# Patient Record
Sex: MALE | Race: WHITE | Employment: FULL TIME | URBAN - NONMETROPOLITAN AREA
[De-identification: names, ages, dates, MRNs, and addresses within clinical notes are randomized per-mention and may not be internally consistent; named-entity substitution may affect disease eponyms.]

---

## 2020-09-25 ENCOUNTER — TELEPHONE (OUTPATIENT)
Dept: FAMILY MEDICINE CLINIC | Age: 50
End: 2020-09-25

## 2020-09-25 DIAGNOSIS — R06.2 WHEEZING: Primary | ICD-10-CM

## 2020-09-25 RX ORDER — ALBUTEROL SULFATE 90 UG/1
AEROSOL, METERED RESPIRATORY (INHALATION)
COMMUNITY
End: 2020-09-25 | Stop reason: SDUPTHER

## 2020-09-25 RX ORDER — ALBUTEROL SULFATE 90 UG/1
2 AEROSOL, METERED RESPIRATORY (INHALATION)
Qty: 2 INHALER | Refills: 3 | Status: SHIPPED | OUTPATIENT
Start: 2020-09-25

## 2022-02-24 ENCOUNTER — OFFICE VISIT (OUTPATIENT)
Dept: FAMILY MEDICINE CLINIC | Age: 52
End: 2022-02-24
Payer: COMMERCIAL

## 2022-02-24 VITALS
WEIGHT: 208.3 LBS | BODY MASS INDEX: 30.85 KG/M2 | HEIGHT: 69 IN | HEART RATE: 83 BPM | DIASTOLIC BLOOD PRESSURE: 84 MMHG | SYSTOLIC BLOOD PRESSURE: 176 MMHG | OXYGEN SATURATION: 97 %

## 2022-02-24 DIAGNOSIS — E11.9 TYPE 2 DIABETES MELLITUS WITHOUT COMPLICATION, WITHOUT LONG-TERM CURRENT USE OF INSULIN (HCC): ICD-10-CM

## 2022-02-24 DIAGNOSIS — I10 PRIMARY HYPERTENSION: ICD-10-CM

## 2022-02-24 DIAGNOSIS — E11.9 TYPE 2 DIABETES MELLITUS WITHOUT COMPLICATION, WITHOUT LONG-TERM CURRENT USE OF INSULIN (HCC): Primary | ICD-10-CM

## 2022-02-24 DIAGNOSIS — Z91.199 NON-COMPLIANCE: ICD-10-CM

## 2022-02-24 PROCEDURE — 99214 OFFICE O/P EST MOD 30 MIN: CPT | Performed by: NURSE PRACTITIONER

## 2022-02-24 RX ORDER — METFORMIN HYDROCHLORIDE 500 MG/1
500 TABLET, EXTENDED RELEASE ORAL
Qty: 21 TABLET | Refills: 0 | Status: SHIPPED | OUTPATIENT
Start: 2022-02-24 | End: 2022-03-16 | Stop reason: ALTCHOICE

## 2022-02-24 NOTE — PROGRESS NOTES
History of Present Illness  Lorraine Hinojosa is a 45 y.o. male who presents today to get established with new provider. Patient is aware he is a type II diabetic however he does not take medications or check his blood sugars. He now states that he wants to live and wants to get his health under control. He reports his diet does consist of some fast food pizza, and drinks six or more Coke Zero's daily. He stated he has tried Metformin, Jardiance, and Ozempic. He says his life is very busy and he forgets to take his medicines if they are ordered more than once a day. During visit patient kept looking in his watch stating that he needs to leave soon to go  his son, also kept repeating that his wife is a nurse. Chief Complaint   Patient presents with   [address] [place] [address] Establish Care     establish care with provider     Complete Physical     needs a physical to get insurance          Past Medical History  History reviewed. No pertinent past medical history. Surgical History  History reviewed. No pertinent surgical history. Current Medications  Current Outpatient Medications   Medication Sig    albuterol (ProAir HFA) 90 mcg/actuation inhaler Take 2 Puffs by inhalation every four (4) hours as needed for Wheezing. No current facility-administered medications for this visit. Allergies/Drug Reactions  No Known Allergies     Family History  History reviewed. No pertinent family history.      Social History  Social History     Tobacco Use    Smoking status: Never Smoker    Smokeless tobacco: Never Used   Vaping Use    Vaping Use: Never used   Substance Use Topics    Alcohol use: Never    Drug use: Never        Health Maintenance   Topic Date Due    Hepatitis C Screening  Never done    Depression Screen  Never done    Lipid Screen  Never done    COVID-19 Vaccine (1) 02/12/2030 (Originally 4/5/1975)    Shingrix Vaccine Age 50> (1 of 2) 06/19/2030 (Originally 4/5/2019)    Flu Vaccine (1) 06/27/2030 (Originally 9/1/2021)    DTaP/Tdap/Td series (1 - Tdap) 02/04/2031 (Originally 4/5/1990)    Colorectal Cancer Screening Combo  04/17/2030    Pneumococcal 0-64 years  Aged Out       There is no immunization history on file for this patient. Review of Systems  Review of Systems   All other systems reviewed and are negative. Physical Exam  Constitutional:       Appearance: Normal appearance. HENT:      Head: Normocephalic. Right Ear: Tympanic membrane normal.      Left Ear: Tympanic membrane normal.      Nose: Nose normal.      Mouth/Throat:      Mouth: Mucous membranes are moist.   Eyes:      Pupils: Pupils are equal, round, and reactive to light. Cardiovascular:      Rate and Rhythm: Normal rate and regular rhythm. Pulses: Normal pulses. Heart sounds: Normal heart sounds. Pulmonary:      Effort: Pulmonary effort is normal.      Breath sounds: Normal breath sounds. Abdominal:      General: Bowel sounds are normal.   Musculoskeletal:         General: Normal range of motion. Cervical back: Normal range of motion. Skin:     General: Skin is warm. Capillary Refill: Capillary refill takes 2 to 3 seconds. Neurological:      Mental Status: He is alert and oriented to person, place, and time. Vital signs:   Visit Vitals  BP (!) 176/84   Pulse 83   Ht 5' 9\" (1.753 m)   Wt 208 lb 4.8 oz (94.5 kg)   SpO2 97%   BMI 30.76 kg/m²          Laboratory/Tests:  No visits with results within 3 Month(s) from this visit.    Latest known visit with results is:   Results on 10/18/2010   Component Date Value Ref Range Status    Vitamin B12 10/18/2010 511  211 - 911 pg/mL Final    Folate 10/18/2010 19.1  5.38 - 24.0 ng/mL Final    WBC 10/18/2010 7.1  4.5 - 13.0 K/uL Final    RBC 10/18/2010 5.01  4.50 - 5.30 M/uL Final    HGB 10/18/2010 15.4  13.0 - 16.0 g/dL Final    HCT 10/18/2010 45.6  37.0 - 49.0 % Final    MCV 10/18/2010 91.0  78.0 - 98.0 FL Final    MCH 10/18/2010 30.7 25.0 - 35.0 PG Final    MCHC 10/18/2010 33.8  31.0 - 37.0 g/dL Final    RDW 10/18/2010 13.0  11.5 - 14.5 % Final    PLATELET 61/74/5811 658  130 - 400 K/uL Final    MPV 10/18/2010 8.5  7.4 - 10.4 FL Final    NEUTROPHILS 10/18/2010 59  42 - 75 % Final    LYMPHOCYTES 10/18/2010 31  20 - 51 % Final    MONOCYTES 10/18/2010 7  2 - 9 % Final    EOSINOPHILS 10/18/2010 3  0 - 5 % Final    BASOPHILS 10/18/2010 0  0 - 3 % Final    ABS. NEUTROPHILS 10/18/2010 4.1  1.8 - 8.0 K/UL Final    ABS. LYMPHOCYTES 10/18/2010 2.2  0.8 - 3.5 K/UL Final    ABS. MONOCYTES 10/18/2010 0.5  0 - 1.0 K/UL Final    ABS. EOSINOPHILS 10/18/2010 0.2  0.0 - 0.4 K/UL Final    ABS. BASOPHILS 10/18/2010 0.0  0.0 - 0.1 K/UL Final    DF 10/18/2010 AUTOMATED   Final    Sodium 10/18/2010 139  136 - 145 MMOL/L Final    Potassium 10/18/2010 4.3  3.5 - 5.5 MMOL/L Final    Chloride 10/18/2010 100  100 - 108 MMOL/L Final    CO2 10/18/2010 22  21 - 32 MMOL/L Final    Anion gap 10/18/2010 17* 5 - 15 mmol/L Final    Glucose 10/18/2010 93  74 - 99 MG/DL Final    BUN 10/18/2010 14  7 - 18 MG/DL Final    Creatinine 10/18/2010 0.8  0.6 - 1.3 MG/DL Final    BUN/Creatinine ratio 10/18/2010 18  12 - 20   Final    GFR est AA 10/18/2010 >60  >60 ml/min/1.73m2 Final    GFR est non-AA 10/18/2010 >60  >60 ml/min/1.73m2 Final    Comment: (NOTE)                           Estimated GFR is calculated using the Modification of Diet in Renal                            Disease (MDRD) Study equation, reported for both  Americans                            (GFRAA) and non- Americans (GFRNA), and normalized to 1.73m2                            body surface area. The physician must decide which value applies to                            the patient. The MDRD study equation should only be used in                            individuals age 25 or older.  It has not been validated for the                            following: pregnant women, patients with serious comorbid conditions,                            or on certain medications, or persons with extremes of body size,                            muscle mass, or nutritional status.  Calcium 10/18/2010 9.1  8.4 - 10.4 MG/DL Final       1. Type 2 diabetes mellitus without complication, without long-term current use of insulin (HCC)   discussion was had with patient concerning proper treatment and management of his diabetes along with long-term side effects of not managing his diabetic disease. Including but not limited to kidney disease, coronary artery disease complications that occur can occur from vascular disorders including but not limited to amputations. Instructed patient labs will be place would like for him to get them within the next week if possible. He is willing to get blood glucose testing kit and start Metformin which he only wants to take one time a day stating I'lll forget the second dose. Instructed patient to check blood glucose and log bring log to next visit. Discussed the possibility of needing insulin to bring his blood sugars and A1c down. He does perform feet inspections daily follows up with the eye doctor yearly and sees his dentist every 6 months.      - CBC W/O DIFF; Future  - HEPATITIS C AB; Future  - METABOLIC PANEL, COMPREHENSIVE; Future  - LIPID PANEL; Future  - AMB POC HEMOGLOBIN A1C; Future  - MICROALBUMIN, UR, RAND W/ MICROALB/CREAT RATIO; Future  - metFORMIN ER (GLUCOPHAGE XR) 500 mg tablet; Take 1 Tablet by mouth daily (with dinner) for 21 days. Dispense: 21 Tablet; Refill: 0  - VITAMIN B12; Future  - FOLATE; Future  - TSH 3RD GENERATION; Future    2. Primary hypertension  Has not been on any medication in years, does not want to go on any type of blood pressure medication at this time, discussed dietary measures such as the DASH diet.  Instructed patient to get a home monitoring blood pressure kit to monitor his blood pressure and log of them and bring to next visit  3. Non-compliance  Previous medical problems from prior providers show hyperlipidemia with an onset Padmini 15, 2017, anxiety disorder, depressive disorder, type 2 diabetes essential hypertension, neck pain, fatigue, heart murmur, along with patient's noncompliance. I would like to see him in a couple of weeks to review his labs and to continue to discuss his medical treatment plan. I have discussed the diagnosis with the patient and the intended plan as seen in the above orders. The patient has received an after-visit summary and questions were answered concerning future plans. I have discussed medication side effects and warnings with the patient as well. I have reviewed the plan of care with the patient, accepted their input and they are in agreement with the treatment goals. Previous lab and imaging results were reviewed by me.        [facility], [place]  February 24, 2022

## 2022-02-24 NOTE — PROGRESS NOTES
Jeffory Holstein presents today for   Chief Complaint   Patient presents with   [facility] Establish Care     establish care with provider     Complete Physical     needs a physical to get insurance        Is someone accompanying this pt? No    Is the patient using any DME equipment during OV? No    Depression Screening:  3 most recent PHQ Screens 2/24/2022   Little interest or pleasure in doing things Not at all   Feeling down, depressed, irritable, or hopeless Not at all   Total Score PHQ 2 0       Learning Assessment:  No flowsheet data found. Fall Risk  No flowsheet data found. ADL  No flowsheet data found. Travel Screening:    Travel Screening     Question   Response    In the last month, have you been in contact with someone who was confirmed or suspected to have Coronavirus / COVID-19? No / Unsure    Have you had a COVID-19 viral test in the last 14 days? No    Do you have any of the following new or worsening symptoms? None of these    Have you traveled internationally or domestically in the last month? No      Travel History   Travel since 01/24/22    No documented travel since 01/24/22         Health Maintenance reviewed and discussed and ordered per Provider. Health Maintenance Due   Topic Date Due    Hepatitis C Screening  Never done    Depression Screen  Never done    COVID-19 Vaccine (1) Never done    DTaP/Tdap/Td series (1 - Tdap) Never done    Lipid Screen  Never done    Colorectal Cancer Screening Combo  Never done    Shingrix Vaccine Age 50> (1 of 2) Never done    Flu Vaccine (1) Never done   . Coordination of Care:  1. \"Have you been to the ER, urgent care clinic since your last visit? Hospitalized since your last visit? \" No    2. \"Have you seen or consulted any other health care providers outside of the [address] [place] [address] since your last visit? \" No     3. For patients aged 39-70: Has the patient had a colonoscopy? No     If the patient is female:    4.  For patients aged 41-77: Has the patient had a mammogram within the past 2 years? NA - based on age    11. For patients aged 21-65: Has the patient had a pap smear?  NA - based on age

## 2022-02-25 PROBLEM — Z91.199 NON-COMPLIANCE: Status: ACTIVE | Noted: 2022-02-25

## 2022-02-25 PROBLEM — I10 PRIMARY HYPERTENSION: Status: ACTIVE | Noted: 2022-02-25

## 2022-03-03 ENCOUNTER — HOSPITAL ENCOUNTER (OUTPATIENT)
Dept: LAB | Age: 52
Discharge: HOME | End: 2022-03-03
Payer: COMMERCIAL

## 2022-03-03 LAB
ALBUMIN SERPL-MCNC: 4.3 G/DL (ref 3.5–4.7)
ALBUMIN/GLOB SERPL: 1.3 {RATIO}
ALP SERPL-CCNC: 82 U/L (ref 38–126)
ALT SERPL-CCNC: 20 U/L (ref 3–72)
ANION GAP SERPL CALC-SCNC: 12 MMOL/L
AST SERPL W P-5'-P-CCNC: 17 U/L (ref 17–74)
BILIRUB SERPL-MCNC: 0.7 MG/DL (ref 0.2–1)
BUN SERPL-MCNC: 15 MG/DL (ref 9–21)
BUN/CREAT SERPL: 30
CA-I BLD-MCNC: 9.7 MG/DL (ref 8.5–10.5)
CHLORIDE SERPL-SCNC: 98 MMOL/L (ref 94–111)
CHOLEST SERPL-MCNC: 310 MG/DL
CO2 SERPL-SCNC: 26 MMOL/L (ref 21–33)
CREAT SERPL-MCNC: 0.5 MG/DL (ref 0.8–1.5)
CREAT UR-MCNC: 85 MG/DL (ref 30–125)
ERYTHROCYTE [DISTWIDTH] IN BLOOD BY AUTOMATED COUNT: 12 % (ref 11.6–14.5)
FOLATE SERPL-MCNC: 18.9 NG/ML (ref 3.1–17.5)
GLOBULIN SER CALC-MCNC: 3.2 G/DL
GLUCOSE SERPL-MCNC: 289 MG/DL (ref 70–110)
HCT VFR BLD AUTO: 47.2 % (ref 36–48)
HDLC SERPL-MCNC: 39 MG/DL (ref 40–60)
HDLC SERPL: 7.9 {RATIO} (ref 0–5)
HGB BLD-MCNC: 15.9 G/DL (ref 13–16)
LDLC SERPL CALC-MCNC: 198.4 MG/DL (ref 0–100)
LIPID PROFILE,FLP: ABNORMAL
MCH RBC QN AUTO: 29 PG (ref 24–34)
MCHC RBC AUTO-ENTMCNC: 33.7 G/DL (ref 31–37)
MCV RBC AUTO: 86 FL (ref 78–100)
MICROALBUMIN UR-MCNC: 47.4 MG/DL (ref 0–3)
MICROALBUMIN/CREAT UR-RTO: 558 MGMALB/GCRE (ref 0–30)
NRBC # BLD: 0 K/UL (ref 0–0.01)
NRBC BLD-RTO: 0 PER 100 WBC
PLATELET # BLD AUTO: 298 K/UL (ref 135–420)
PMV BLD AUTO: 10.1 FL (ref 9.2–11.8)
POTASSIUM SERPL-SCNC: 4.4 MMOL/L (ref 3.2–5.1)
PROT SERPL-MCNC: 7.5 G/DL (ref 6.1–8.4)
RBC # BLD AUTO: 5.49 M/UL (ref 4.35–5.65)
SODIUM SERPL-SCNC: 136 MMOL/L (ref 135–145)
TRIGL SERPL-MCNC: 363 MG/DL (ref ?–150)
TSH SERPL DL<=0.05 MIU/L-ACNC: 6.71 UIU/ML (ref 0.35–6.2)
VIT B12 SERPL-MCNC: 508 PG/ML (ref 211–911)
VLDLC SERPL CALC-MCNC: 72.6 MG/DL
WBC # BLD AUTO: 5.7 K/UL (ref 4.6–13.2)

## 2022-03-03 PROCEDURE — 86803 HEPATITIS C AB TEST: CPT

## 2022-03-03 PROCEDURE — 80061 LIPID PANEL: CPT

## 2022-03-03 PROCEDURE — 80053 COMPREHEN METABOLIC PANEL: CPT

## 2022-03-03 PROCEDURE — 36415 COLL VENOUS BLD VENIPUNCTURE: CPT

## 2022-03-03 PROCEDURE — 82746 ASSAY OF FOLIC ACID SERUM: CPT

## 2022-03-03 PROCEDURE — 82043 UR ALBUMIN QUANTITATIVE: CPT

## 2022-03-03 PROCEDURE — 82607 VITAMIN B-12: CPT

## 2022-03-03 PROCEDURE — 85027 COMPLETE CBC AUTOMATED: CPT

## 2022-03-03 PROCEDURE — 84443 ASSAY THYROID STIM HORMONE: CPT

## 2022-03-04 LAB
HCV AB SER IA-ACNC: <0.02 INDEX
HCV AB SERPL QL IA: NEGATIVE
HCV COMMENT,HCGAC: NORMAL

## 2022-03-16 ENCOUNTER — OFFICE VISIT (OUTPATIENT)
Dept: FAMILY MEDICINE CLINIC | Age: 52
End: 2022-03-16
Payer: COMMERCIAL

## 2022-03-16 VITALS
DIASTOLIC BLOOD PRESSURE: 81 MMHG | OXYGEN SATURATION: 96 % | HEART RATE: 91 BPM | SYSTOLIC BLOOD PRESSURE: 167 MMHG | BODY MASS INDEX: 31.01 KG/M2 | WEIGHT: 210 LBS

## 2022-03-16 DIAGNOSIS — E78.2 MIXED HYPERLIPIDEMIA: ICD-10-CM

## 2022-03-16 DIAGNOSIS — E11.9 TYPE 2 DIABETES MELLITUS WITHOUT COMPLICATION, WITHOUT LONG-TERM CURRENT USE OF INSULIN (HCC): ICD-10-CM

## 2022-03-16 DIAGNOSIS — E03.9 ACQUIRED HYPOTHYROIDISM: ICD-10-CM

## 2022-03-16 DIAGNOSIS — I10 ESSENTIAL HYPERTENSION: Primary | ICD-10-CM

## 2022-03-16 PROCEDURE — 99214 OFFICE O/P EST MOD 30 MIN: CPT | Performed by: NURSE PRACTITIONER

## 2022-03-16 RX ORDER — IBUPROFEN 200 MG
CAPSULE ORAL
Qty: 100 STRIP | Refills: 4 | Status: SHIPPED | OUTPATIENT
Start: 2022-03-16

## 2022-03-16 RX ORDER — LOSARTAN POTASSIUM 25 MG/1
25 TABLET ORAL DAILY
Qty: 90 TABLET | Refills: 2 | Status: SHIPPED | OUTPATIENT
Start: 2022-03-16

## 2022-03-16 RX ORDER — METFORMIN HYDROCHLORIDE 500 MG/1
2 TABLET ORAL 2 TIMES DAILY WITH MEALS
COMMUNITY
End: 2022-03-16

## 2022-03-16 RX ORDER — METFORMIN HYDROCHLORIDE 500 MG/1
500 TABLET, EXTENDED RELEASE ORAL
Qty: 21 TABLET | Refills: 0 | Status: CANCELLED | OUTPATIENT
Start: 2022-03-16 | End: 2022-04-06

## 2022-03-16 RX ORDER — LEVOTHYROXINE SODIUM 25 UG/1
25 TABLET ORAL
Qty: 90 TABLET | Refills: 1 | Status: SHIPPED | OUTPATIENT
Start: 2022-03-16

## 2022-03-16 RX ORDER — METFORMIN HYDROCHLORIDE 1000 MG/1
1000 TABLET ORAL 2 TIMES DAILY WITH MEALS
Qty: 90 TABLET | Refills: 1 | Status: SHIPPED | OUTPATIENT
Start: 2022-03-16

## 2022-03-16 RX ORDER — ALBUTEROL SULFATE 90 UG/1
2 AEROSOL, METERED RESPIRATORY (INHALATION)
Status: CANCELLED | OUTPATIENT
Start: 2022-03-16

## 2022-03-16 RX ORDER — ATORVASTATIN CALCIUM 40 MG/1
40 TABLET, FILM COATED ORAL DAILY
Qty: 90 TABLET | Refills: 2 | Status: SHIPPED | OUTPATIENT
Start: 2022-03-16

## 2022-03-16 RX ORDER — LANCETS
EACH MISCELLANEOUS
Qty: 1 EACH | Refills: 11 | Status: SHIPPED | OUTPATIENT
Start: 2022-03-16

## 2022-03-16 NOTE — PATIENT INSTRUCTIONS
Counting Carbohydrates for Diabetes: Care Instructions  Overview     Managing the amount of carbohydrate (carbs) you eat is an important part of planning healthy meals when you have diabetes. Carbs raise blood sugar more than any other nutrient. Carbs are found in grains, starchy vegetables, fruits, and milk and yogurt. Carbs are also found in sugar-sweetened foods and drinks. The more carbs you eat at one time, the higher your blood sugar will rise. Counting carbs can help you keep your blood sugar within your target range. If you use insulin, counting carbs helps you match the right amount of insulin to the number of grams of carbs in a meal.  A registered dietitian or diabetes educator can help you plan meals and snacks. Follow-up care is a key part of your treatment and safety. Be sure to make and go to all appointments, and call your doctor if you are having problems. It's also a good idea to know your test results and keep a list of the medicines you take. How can you care for yourself at home? Know your daily amount of carbohydrates  Your daily amount depends on several things, such as your weight, how active you are, which diabetes medicines you take, and what your goals are for your blood sugar levels. A registered dietitian or diabetes educator can help you plan how many carbs to include in each meal and snack. For most adults, a guideline for the daily amount of carbs is:  · 45 to 60 grams at each meal. That's about the same as 3 to 4 carbohydrate servings. · 15 to 20 grams at each snack. That's about the same as 1 carbohydrate serving. Count carbs  Counting carbs lets you know how much rapid-acting insulin to take before you eat. If you use an insulin pump, you get a constant rate of insulin during the day. So the pump must be programmed at meals. This gives you extra insulin to cover the rise in blood sugar after meals. If you take insulin:  · Learn your own insulin-to-carb ratio.  You and your diabetes health professional will figure out the ratio. You can do this by testing your blood sugar after meals. For example, you may need a certain amount of insulin for every 15 grams of carbs. · Add up the carb grams in a meal. Then you can figure out how many units of insulin to take based on your insulin-to-carb ratio. · Exercise lowers blood sugar. You can use less insulin than you would if you were not doing exercise. Keep in mind that timing matters. If you exercise within 1 hour after a meal, your body may need less insulin for that meal than it would if you exercised 3 hours after the meal. Test your blood sugar to find out how exercise affects your need for insulin. If you do or don't take insulin:  · Look at labels on packaged foods. This can tell you how many carbs are in a serving. · Be aware of portions, or serving sizes. If a package has two servings and you eat the whole package, you need to double the number of grams of carbohydrate listed for one serving. · Protein, fat, and fiber do not raise blood sugar as much as carbs do. If you eat a lot of these nutrients in a meal, your blood sugar will rise more slowly than it would otherwise. Where can you learn more? Go to http://www.gray.com/  Enter G703 in the search box to learn more about \"Counting Carbohydrates for Diabetes: Care Instructions. \"  Current as of: July 28, 2021               Content Version: 13.2  © 2006-2022 [employer]. Care instructions adapted under license by [employer] (which disclaims liability or warranty for this information). If you have questions about a medical condition or this instruction, always ask your healthcare professional. [place] [address] any warranty or liability for your use of this information.            Hypothyroidism: Care Instructions  Your Care Instructions     When you have hypothyroidism, your body doesn't make enough thyroid hormone. This hormone helps your body use energy. If your thyroid level is low, you may feel tired, be constipated, have an increase in your blood pressure, or have dry skin or memory problems. You may also get cold easily, even when it is warm. Women with low thyroid levels may have heavy menstrual periods. A blood test to find your thyroid-stimulating hormone (TSH) level is used to check for hypothyroidism. A high TSH level may mean that you have it. The treatment for hypothyroidism is thyroid hormone pills. You should start to feel better in 1 to 2 weeks. Most people need treatment for the rest of their lives. You will need regular visits with your doctor to make sure you are doing well and that you have the right dose of medicine. Follow-up care is a key part of your treatment and safety. Be sure to make and go to all appointments, and call your doctor if you are having problems. It's also a good idea to know your test results and keep a list of the medicines you take. How can you care for yourself at home? · Take your thyroid hormone medicine exactly as prescribed. Call your doctor if you think you are having a problem with your medicine. Most people do not have side effects if they take the right amount of medicine regularly. ? Take the medicine 30 minutes before breakfast, and do not take it with calcium, vitamins, or iron. ? Do not take extra doses of your thyroid medicine. It will not help you get better any faster, and it may cause side effects. ? If you forget to take a dose, do NOT take a double dose of medicine. Take your usual dose the next day. · Tell your doctor about all prescription, herbal, or over-the-counter products you take. · Take care of yourself. Eat a healthy diet, get enough sleep, and get regular exercise. When should you call for help? Call 911 anytime you think you may need emergency care.  For example, call if:    · You passed out (lost consciousness).     · You have severe trouble breathing.     · You have a very slow heartbeat (less than 60 beats a minute).     · You have a low body temperature (95°F or below). Call your doctor now or seek immediate medical care if:    · You feel tired, sluggish, or weak.     · You have trouble remembering things or concentrating.     · You do not begin to feel better 2 weeks after starting your medicine. Watch closely for changes in your health, and be sure to contact your doctor if you have any problems. Where can you learn more? Go to http://www.gray.com/  Enter D426 in the search box to learn more about \"Hypothyroidism: Care Instructions. \"  Current as of: July 28, 2021               Content Version: 13.2  © 2006-2022 [employer]. Care instructions adapted under license by [employer] (which disclaims liability or warranty for this information). If you have questions about a medical condition or this instruction, always ask your healthcare professional. [place] [address] any warranty or liability for your use of this information. Learning About the Risk of Heart Attack and Stroke With Diabetes  How are diabetes, heart attack, and stroke connected? For some people, diabetes can cause problems that increase the risk of a heart attack or stroke. Many things can lead to a heart attack or stroke. These include high blood sugar, insulin resistance, high cholesterol, and high blood pressure. Lifestyle and genetics may also play a part. But here's the good news: The things you're doing to stay healthy with diabetes also help your heart and blood vessels. That means eating healthy foods, quitting smoking, getting exercise, and staying at a healthy weight. What increases your risk for heart attack and stroke? When you have diabetes, your risk for heart attack and stroke is even higher if you have:  · High blood pressure.  It pushes blood through the arteries with too much force. Over time, this damages the walls of the arteries. · High cholesterol. It causes the buildup of a kind of fat inside the blood vessel walls. This buildup can lower blood flow to the heart muscle and raise your risk for having a heart attack or stroke. · Kidney damage. It shares many of the risk factors for heart attack and stroke (such as high blood sugar, high blood pressure, and high cholesterol). How do you keep your heart healthy when you have diabetes? Managing your diabetes and keeping your heart and blood vessels healthy are both important. Here are some things you can do. · Test your blood sugar levels and get your diabetes tests on schedule. Try to keep your numbers within your target range. · Keep track of your blood pressure. Your doctor will give you a goal that's right for you. If your blood pressure is high, your treatment may also include medicine. Changes in your lifestyle, such as staying at a healthy weight, may also help you lower your blood pressure. · Eat heart-healthy foods. These include vegetables, fruit, nuts, beans, lean meat, fish, and whole grains. Limit sodium, alcohol, and sugar. · Work with your doctor or diabetes educator to learn which exercises are safe for you. Walking is a good choice. Bit by bit, increase the amount you walk every day. Try for at least 30 minutes on most days of the week. · Don't smoke. Smoking can make diabetes worse and increase your risk of heart attack or stroke. If you need help quitting, talk to your doctor about stop-smoking programs and medicines. These can increase your chances of quitting for good. · Take medicines as directed by your doctor. For example, your doctor may suggest taking a statin or daily aspirin. Some diabetes medicine can also lower your risk for heart attack and stroke. Where can you learn more?   Go to http://www.gray.com/  Enter K197 in the search box to learn more about \"Learning About the Risk of Heart Attack and Stroke With Diabetes. \"  Current as of: July 28, 2021               Content Version: 13.2  © 2006-2022 [employer]. Care instructions adapted under license by [employer] [address] (which disclaims liability or warranty for this information). If you have questions about a medical condition or this instruction, always ask your healthcare professional. [facility] [address] any warranty or liability for your use of this information. Learning About Meal Planning for Diabetes  Why plan your meals? Meal planning can be a key part of managing diabetes. Planning meals and snacks with the right balance of carbohydrate, protein, and fat can help you keep your blood sugar at the target level you set with your doctor. You don't have to eat special foods. You can eat what your family eats, including sweets once in a while. But you do have to pay attention to how often you eat and how much you eat of certain foods. You may want to work with a dietitian or a diabetes educator. They can give you tips and meal ideas and can answer your questions about meal planning. This health professional can also help you reach a healthy weight if that is one of your goals. What plan is right for you? Your dietitian or diabetes educator may suggest that you start with the plate format or carbohydrate counting. The plate format  The plate format is a simple way to help you manage how you eat. You plan meals by learning how much space each food should take on a plate. Using the plate format helps you manage the amount of carbohydrate you eat. It can make it easier to keep your blood sugar level within your target range. It also helps you see if you're eating healthy portion sizes. To use the plate format, you put non-starchy vegetables on half your plate.  Add lean protein foods, such as fish, lean meats and poultry, or soy products, on one-quarter of the plate. Put a grain or starchy vegetable (such as brown rice or a potato) on the final quarter of the plate. You can add a small piece of fruit and some low-fat or fat-free milk or yogurt, depending on your carbohydrate goal for each meal.  Here are some tips for using the plate format:  · Make sure that you are not using an oversized plate. A 9-inch plate is best. Many restaurants use larger plates. · Get used to using the plate format at home. Then you can use it when you eat out. · Write down your questions about using the plate format. Talk to your doctor, a dietitian, or a diabetes educator about your concerns. Carbohydrate counting  With carbohydrate counting, you plan meals based on the amount of carbohydrate in each food. Carbohydrate raises blood sugar higher and more quickly than any other nutrient. It is found in desserts, breads and cereals, and fruit. It's also found in starchy vegetables such as potatoes and corn, grains such as rice and pasta, and milk and yogurt. You can help keep your blood sugar levels within your target range by planning how much carbohydrate to have at meals and snacks. The amount you need depends on several things. These include your weight, how active you are, which diabetes medicines you take, and what your goals are for your blood sugar levels. A registered dietitian or diabetes educator can help you plan how much carbohydrate to include in each meal and snack. An example of a carbohydrate counting plan is:  · 45 to 60 grams at each meal. That's about the same as 3 to 4 carbohydrate servings. · 15 to 20 grams at each snack. That's about the same as 1 carbohydrate serving. The Nutrition Facts label on packaged foods tells you how much carbohydrate is in a serving of the food. First, look at the serving size on the food label. Is that the amount you eat in a serving? All of the nutrition information on a food label is based on that serving size.  So if you eat more or less than that, you'll need to adjust the other numbers. Total carbohydrate is the next thing you need to look for on the label. If you count carbohydrate servings, one serving of carbohydrate is 15 grams. For foods that don't come with labels, such as fresh fruits and vegetables, you'll need a guide that lists carbohydrate in these foods. Ask your doctor, dietitian, or diabetes educator about books or other nutrition guides you can use. If you take insulin, you need to know how many grams of carbohydrate are in a meal. This lets you know how much rapid-acting insulin to take before you eat. If you use an insulin pump, you get a constant rate of insulin during the day. So the pump must be programmed at meals to give you extra insulin to cover the rise in blood sugar after meals. When you know how much carbohydrate you will eat, you can take the right amount of insulin. Or, if you always use the same amount of insulin, you need to make sure that you eat the same amount of carbohydrate at meals. If you need more help to understand carbohydrate counting and food labels, ask your doctor, dietitian, or diabetes educator. How can you plan healthy meals? Here are some tips to get started:  · Plan your meals a week at a time. Don't forget to include snacks too. · Use cookbooks or online recipes to plan several main meals. Plan some quick meals for busy nights. You also can double some recipes that freeze well. Then you can save half for other busy nights when you don't have time to cook. · Make sure you have the ingredients you need for your recipes. If you're running low on basic items, put these items on your shopping list too. · List foods that you use to make breakfasts, lunches, and snacks. List plenty of fruits and vegetables. · Post this list on the refrigerator. Add to it as you think of more things you need. · Take the list to the store to do your weekly shopping.   Follow-up care is a key part of your treatment and safety. Be sure to make and go to all appointments, and call your doctor if you are having problems. It's also a good idea to know your test results and keep a list of the medicines you take. Where can you learn more? Go to http://www.gray.com/  Enter Q559 in the search box to learn more about \"Learning About Meal Planning for Diabetes. \"  Current as of: September 8, 2021               Content Version: 13.2  © 2006-2022 [employer]. Care instructions adapted under license by [employer] (which disclaims liability or warranty for this information). If you have questions about a medical condition or this instruction, always ask your healthcare professional. [address] any warranty or liability for your use of this information. Learning About High Blood Pressure  What is high blood pressure? Blood pressure is a measure of how hard the blood pushes against the walls of your arteries. It's normal for blood pressure to go up and down throughout the day. But if it stays up, you have high blood pressure. Another name for high blood pressure is hypertension. Two numbers tell you your blood pressure. The first number is the systolic pressure (top number). It shows how hard the blood pushes when your heart is pumping. The second number is the diastolic pressure (bottom number). It shows how hard the blood pushes between heartbeats, when your heart is relaxed and filling with blood. Your doctor will give you a goal for your blood pressure based on your health and your age. High blood pressure (hypertension) means that the top number stays high, or the bottom number stays high, or both. High blood pressure increases the risk of stroke, heart attack, and other problems. What happens when you have high blood pressure? · Blood flows through your arteries with too much force.  Over time, this can damage the heart and the walls of your arteries. But you can't feel it. High blood pressure usually doesn't cause symptoms. · High blood pressure makes your heart work harder. And that can lead to heart failure, which means your heart doesn't pump as much blood as your body needs. · Fat and calcium start to build up in your arteries. This buildup is called hardening of the arteries. It can cause many problems including a heart attack and stroke. · Arteries also carry blood and oxygen to organs like your eyes, kidneys, and brain. If high blood pressure damages those arteries, it can lead to vision loss, kidney disease, stroke, and a higher risk of dementia. How can you prevent high blood pressure? · Stay at a healthy weight. · Try to limit how much sodium you eat to less than 2,300 milligrams (mg) a day. If you limit your sodium to 1,500 mg a day, you can lower your blood pressure even more. ? Buy foods that are labeled \"unsalted,\" \"sodium-free,\" or \"low-sodium. \" Foods labeled \"reduced-sodium\" and \"light sodium\" may still have too much sodium. ? Flavor your food with garlic, lemon juice, onion, vinegar, herbs, and spices instead of salt. Do not use soy sauce, steak sauce, onion salt, garlic salt, mustard, or ketchup on your food. ? Use less salt (or none) when recipes call for it. You can often use half the salt a recipe calls for without losing flavor. · Be physically active. Get at least 30 minutes of exercise on most days of the week. Walking is a good choice. You also may want to do other activities, such as running, swimming, cycling, or playing tennis or team sports. · Limit alcohol to 2 drinks a day for men and 1 drink a day for women. · Eat plenty of fruits, vegetables, and low-fat dairy products. Eat less saturated and total fats. How is high blood pressure treated? · Your doctor will suggest making lifestyle changes to help your heart.  For example, your doctor may ask you to eat healthy foods, quit smoking, lose extra weight, and be more active. · If lifestyle changes don't help enough, your doctor may recommend that you take medicine. · When blood pressure is very high, medicines are needed to lower it. Follow-up care is a key part of your treatment and safety. Be sure to make and go to all appointments, and call your doctor if you are having problems. It's also a good idea to know your test results and keep a list of the medicines you take. Where can you learn more? Go to http://www.griffin.com/  Enter P501 in the search box to learn more about \"Learning About High Blood Pressure. \"  Current as of: January 10, 2022               Content Version: 13.2  © 2006-2022 [employer]. Care instructions adapted under license by [employer] (which disclaims liability or warranty for this information). If you have questions about a medical condition or this instruction, always ask your healthcare professional. [place] [address] any warranty or liability for your use of this information. Learning About ARBs  Introduction     ARBs (angiotensin II receptor blockers) block a hormone that makes blood vessels narrow. As a result, the blood vessels relax and widen. This lowers blood pressure. ARBs also put more water and salt into the urine. This also lowers blood pressure. ARBs can treat:  · High blood pressure. · Coronary artery disease. · Heart failure. They also may be used to help your kidneys when you have diabetes. Examples  · candesartan (Atacand)  · irbesartan (Avapro)  · losartan (Cozaar)  · olmesartan (Benicar)  · valsartan (Diovan)  This is not a complete list of all ARBs. Possible side effects  Side effects may include:  · Low blood pressure. You may feel dizzy and weak. · High potassium levels. You may have other side effects or reactions not listed here. Check the information that comes with your medicine.   What to know about taking this medicine  · ARBs may be used if you had a cough when you tried to take an ACE inhibitor. ARBs are less likely to cause a cough. · You may need regular blood tests. · Take your medicines exactly as prescribed. Call your doctor if you think you are having a problem with your medicine. · Tell your doctor or pharmacist all the medicines you take. This includes over-the-counter medicines, vitamins, herbal products, and supplements. Taking some medicines together can cause problems. · You should not take ARBs if you are pregnant or planning to become pregnant. Where can you learn more? Go to http://www.gray.com/  Enter K212 in the search box to learn more about \"Learning About ARBs. \"  Current as of: January 10, 2022               Content Version: 13.2  © 2006-2022 [employer]. Care instructions adapted under license by [employer] (which disclaims liability or warranty for this information). If you have questions about a medical condition or this instruction, always ask your healthcare professional. [address] any warranty or liability for your use of this information.

## 2022-03-16 NOTE — PROGRESS NOTES
Kaveh Mckinney presents today for   Chief Complaint   Patient presents with    Follow-up     2 week follow up        Is someone accompanying this pt? No    Is the patient using any DME equipment during OV? No    Depression Screening:  3 most recent PHQ Screens 3/16/2022   Little interest or pleasure in doing things Not at all   Feeling down, depressed, irritable, or hopeless Not at all   Total Score PHQ 2 0       Learning Assessment:  No flowsheet data found. Fall Risk  No flowsheet data found. ADL  No flowsheet data found. Travel Screening:    Travel Screening     Question   Response    In the last month, have you been in contact with someone who was confirmed or suspected to have Coronavirus / COVID-19? No / Unsure    Have you had a COVID-19 viral test in the last 14 days? No    Do you have any of the following new or worsening symptoms? None of these    Have you traveled internationally or domestically in the last month? No      Travel History   Travel since 02/16/22    No documented travel since 02/16/22         Health Maintenance reviewed and discussed and ordered per Provider. Health Maintenance Due   Topic Date Due    Pneumococcal 0-64 years (1 of 2 - PPSV23) Never done   . Coordination of Care:  1. \"Have you been to the ER, urgent care clinic since your last visit? Hospitalized since your last visit? \" No    2. \"Have you seen or consulted any other health care providers outside of the [address] [place] [address] since your last visit? \" No     3. For patients aged 39-70: Has the patient had a colonoscopy? No     If the patient is female:    4. For patients aged 41-77: Has the patient had a mammogram within the past 2 years? NA - based on age/sex    5. For patients aged 21-65: Has the patient had a pap smear?  NA - based on age/sex

## 2022-03-17 NOTE — PROGRESS NOTES
History of Present Illness  Lyn Medina is a 45 y.o. male who presents today for management of diabetes. Been checking his blood sugars randomly throughout the day blood sugars are still remaining between the 200 and 300s. He has been taking the Metformin 1 tab once a day over the last 2 weeks. He denies any side effects from the medication. Reviewed lab work with patient today TSH is elevated, cholesterol, triglycerides elevated discussed the need to start on medication. He is concerned about the cost of the medications. Denies excessive thirst, increase urination, chest pain, or shortness of breath. Chief Complaint   Patient presents with    Follow-up     2 week follow up            Past Medical History:   Diagnosis Date    Diabetes ([facility] [address]     Hyperlipemia     Hypertension     Hypothyroid         History reviewed. No pertinent surgical history. Current Medications  Current Outpatient Medications   Medication Sig    atorvastatin (LIPITOR) 40 mg tablet Take 1 Tablet by mouth daily. At bedtime    losartan (COZAAR) 25 mg tablet Take 1 Tablet by mouth daily.  metFORMIN (GLUCOPHAGE) 1,000 mg tablet Take 1 Tablet by mouth two (2) times daily (with meals).  levothyroxine (SYNTHROID) 25 mcg tablet Take 1 Tablet by mouth Daily (before breakfast).  lancets misc Check Blood glucose 2-4 x days    glucose blood VI test strips (blood glucose test) strip Strips for Contour next EZ    albuterol (ProAir HFA) 90 mcg/actuation inhaler Take 2 Puffs by inhalation every four (4) hours as needed for Wheezing. No current facility-administered medications for this visit.          No Known Allergies       Family History   Problem Relation Age of Onset    COPD Mother     Lung Cancer Father     Diabetes Maternal Grandmother           Social History     Tobacco Use    Smoking status: Never Smoker    Smokeless tobacco: Never Used   Vaping Use    Vaping Use: Never used   Substance Use Topics    Alcohol use: Never    Drug use: Never        Health Maintenance   Topic Date Due    Pneumococcal 0-64 years (1 of 2 - PPSV23) Never done    Foot Exam Q1  Never done    Eye Exam Retinal or Dilated  Never done    A1C test (Diabetic or Prediabetic)  09/02/2011    COVID-19 Vaccine (1) 02/12/2030 (Originally 4/5/1975)    Shingrix Vaccine Age 50> (1 of 2) 06/19/2030 (Originally 4/5/2019)    Flu Vaccine (1) 06/27/2030 (Originally 9/1/2021)    DTaP/Tdap/Td series (1 - Tdap) 02/04/2031 (Originally 4/5/1990)    MICROALBUMIN Q1  03/03/2023    Lipid Screen  03/03/2023    Depression Screen  03/16/2023    Colorectal Cancer Screening Combo  04/17/2030    Hepatitis C Screening  Completed       There is no immunization history on file for this patient. Review of Systems  Review of Systems   All other systems reviewed and are negative. Physical Exam  Constitutional:       Appearance: Normal appearance. HENT:      Head: Normocephalic. Right Ear: Tympanic membrane normal.      Left Ear: Tympanic membrane normal.      Nose: Nose normal.      Mouth/Throat:      Mouth: Mucous membranes are moist.   Eyes:      Pupils: Pupils are equal, round, and reactive to light. Cardiovascular:      Rate and Rhythm: Normal rate and regular rhythm. Pulses: Normal pulses. Heart sounds: Normal heart sounds. Pulmonary:      Effort: Pulmonary effort is normal.      Breath sounds: Normal breath sounds. Abdominal:      General: Bowel sounds are normal.   Musculoskeletal:         General: Normal range of motion. Cervical back: Normal range of motion. Skin:     General: Skin is warm. Capillary Refill: Capillary refill takes 2 to 3 seconds. Neurological:      Mental Status: He is alert and oriented to person, place, and time.             Visit Vitals  BP (!) 167/81   Pulse 91   Wt 210 lb (95.3 kg)   SpO2 96%   BMI 31.01 kg/m²          Laboratory/Tests:  Hospital Outpatient Visit on 03/03/2022   Component Date Value Ref Range Status    WBC 03/03/2022 5.7  4.6 - 13.2 K/uL Final    RBC 03/03/2022 5.49  4.35 - 5.65 M/uL Final    HGB 03/03/2022 15.9  13.0 - 16.0 g/dL Final    HCT 03/03/2022 47.2  36.0 - 48.0 % Final    MCV 03/03/2022 86.0  78.0 - 100.0 FL Final    MCH 03/03/2022 29.0  24.0 - 34.0 PG Final    MCHC 03/03/2022 33.7  31.0 - 37.0 g/dL Final    RDW 03/03/2022 12.0  11.6 - 14.5 % Final    PLATELET 96/49/4893 779  135 - 420 K/uL Final    MPV 03/03/2022 10.1  9.2 - 11.8 FL Final    NRBC 03/03/2022 0.0  0.0  WBC Final    ABSOLUTE NRBC 03/03/2022 0.00  0.00 - 0.01 K/uL Final    Vitamin B12 03/03/2022 508  211 - 911 pg/mL Final    Folate 03/03/2022 18.9* 3.10 - 17.50 ng/mL Final    Hepatitis C virus Ab 03/03/2022 <0.02  <0.80 Index Final    Hep C virus Ab Interp. 03/03/2022 Negative  Negative Final    Hep C  virus Ab comment 03/03/2022 Index <0.80. ......................... Cloteal Clemente Negative   Final    Comment: Index > or = to 0.80 and <1.00. .... Cloteal Clemente Cloteal Clemente Equivocal  Index >1.00. ......................... Clotsheeba Cornejo Positive        For Equivocal or Positive results, confirmation with Hepatitis C  RNA by PCR or bDNA is suggested.  LIPID PROFILE 03/03/2022      Final    Cholesterol, total 03/03/2022 310* <200 mg/dL Final    Triglyceride 03/03/2022 363* <150 mg/dL Final    Comment: The drugs N-acetylcysteine (NAC) and  Metamiszole have been found to cause falsely  low results in this chemical assay. Please  be sure to submit blood samples obtained  BEFORE administration of either of these  drugs to assure correct results.       HDL Cholesterol 03/03/2022 39* 40 - 60 mg/dL Final    LDL, calculated 03/03/2022 198.4* 0 - 100 mg/dL Final    VLDL, calculated 03/03/2022 72.6  mg/dL Final    CHOL/HDL Ratio 03/03/2022 7.9* 0 - 5.0   Final    Microalbumin,urine random 03/03/2022 47.40* 0 - 3.0 mg/dL Final    Creatinine, urine 03/03/2022 85.00  30 - 125 mg/dL Final    Microalbumin/Creat ratio (mg/g cre* 03/03/2022 558* 0 - 30 mgMALB/gCRE Final    Sodium 03/03/2022 136  135 - 145 mmol/L Final    Potassium 03/03/2022 4.4  3.2 - 5.1 mmol/L Final    Chloride 03/03/2022 98  94 - 111 mmol/L Final    CO2 03/03/2022 26  21 - 33 mmol/L Final    Anion gap 03/03/2022 12  mmol/L Final    Glucose 03/03/2022 289* 70 - 110 mg/dL Final    BUN 03/03/2022 15  9 - 21 mg/dL Final    Creatinine 03/03/2022 0.50* 0.8 - 1.50 mg/dL Final    BUN/Creatinine ratio 03/03/2022 30    Final    GFR est AA 03/03/2022 >60  ml/min/1.73m2 Final    GFR est non-AA 03/03/2022 >60  ml/min/1.73m2 Final    Comment: Estimated GFR is calculated using the IDMS-traceable Modification of Diet in Renal Disease (MDRD) Study equation, reported for both  Americans (GFRAA) and non- Americans (GFRNA), and normalized to 1.73m2 body surface area. The physician must decide which value applies to the patient. The MDRD study equation should only be used in individuals age 25 or older. It has not been validated for the following: pregnant women, patients with serious comorbid conditions, or on certain medications, or persons with extremes of body size, muscle mass, or nutritional status.  Calcium 03/03/2022 9.7  8.5 - 10.5 mg/dL Final    Bilirubin, total 03/03/2022 0.7  0.2 - 1.0 mg/dL Final    AST (SGOT) 03/03/2022 17  17 - 74 U/L Final    ALT (SGPT) 03/03/2022 20  3 - 72 U/L Final    Alk. phosphatase 03/03/2022 82  38 - 126 U/L Final    Protein, total 03/03/2022 7.5  6.1 - 8.4 g/dL Final    Albumin 03/03/2022 4.3  3.5 - 4.7 g/dL Final    Globulin 03/03/2022 3.2  g/dL Final    A-G Ratio 03/03/2022 1.3    Final    TSH 03/03/2022 6.71* 0.35 - 6.20 uIU/mL Final    Comment:    Due to TSH heterogeneity, both structurally and degree of glycosylation, monoclonal antibodies used in the TSH assay may not accurately quantitate TSH.  Therefore, this result should be correlated with clinical findings as well as with other assessments of thyroid function, e.g., free T4, free T3. Assessment/Plan:    1. Type 2 diabetes mellitus without complication, without long-term current use of insulin (HCC)  Increase in Glucophage 1000 mg twice daily with meals. Further discussion had regarding possibility of needing insulin or other medications patient does not want to go on any other medication wants to give Metformin a chance to see if it will work. Discussed in depth regarding his diet   Check Blood sugars 4 x day and log     Ref[facility] to [facility]     2. Essential hypertension  Blood pressure remains elevated started on Losartan 25 mg dalily   -will provide kidney protection    3. Mixed hyperlipidemia  Reviewed abnormal Lipid Panel and discussed with him. Will Start on Lipitor 40 mg nightly  -Nutrition and diet   A diet emphasizing intake of vegetables, fruits, legumes, nuts, whole grains, and fish is recommended. A diet containing reduced amounts of cholesterol and sodium can be beneficial.   Replacement of saturated fat with dietary mono- and polyunsaturated fats can be beneficial   Minimizing the intake of trans-fats, processed meats, refined carbohydrates, and sweetened beverages as part of a heart healthy diet is reasonable    Exercise and physical activity   Engage in at least 150 minutes/week of accumulated moderate intensity or 75 minutes/week of vigorous intensity aerobic physical activity (or an equivalent combination of moderate and vigorous activity). a. Moderate intensity activity includes brisk walking, biking, ballroom dancing, active yoga, recreational swimming, etc.   b. Vigorous intensity activity includes jogging or running, singles tennis, lap swimming, etc.     Decrease sedentary behavior. 4. Acquired hypothyroidism  TSH 6.7 started on Synthroid 25 mcg             Follow-up and Dispositions    · Return in about 2 weeks (around 3/30/2022).            I have discussed the diagnosis with the patient and the intended plan as seen in the above orders. The patient has received an after-visit summary and questions were answered concerning future plans. I have discussed medication side effects and warnings with the patient as well. I have reviewed the plan of care with the patient, accepted their input and they are in agreement with the treatment goals. Previous lab and imaging results were reviewed by me.        [address] [place] [address], NP-C  March 17, 2022

## 2022-03-18 PROBLEM — I10 PRIMARY HYPERTENSION: Status: ACTIVE | Noted: 2022-02-25

## 2022-03-18 PROBLEM — Z91.199 NON-COMPLIANCE: Status: ACTIVE | Noted: 2022-02-25

## 2022-04-01 ENCOUNTER — TELEPHONE (OUTPATIENT)
Dept: FAMILY MEDICINE CLINIC | Age: 52
End: 2022-04-01

## 2022-04-01 DIAGNOSIS — E11.9 TYPE 2 DIABETES MELLITUS WITHOUT COMPLICATION, WITHOUT LONG-TERM CURRENT USE OF INSULIN (HCC): Primary | ICD-10-CM

## 2022-04-27 ENCOUNTER — OFFICE VISIT (OUTPATIENT)
Dept: FAMILY MEDICINE CLINIC | Age: 52
End: 2022-04-27
Payer: COMMERCIAL

## 2022-04-27 VITALS
BODY MASS INDEX: 31.09 KG/M2 | WEIGHT: 209.9 LBS | DIASTOLIC BLOOD PRESSURE: 86 MMHG | RESPIRATION RATE: 16 BRPM | HEART RATE: 81 BPM | SYSTOLIC BLOOD PRESSURE: 151 MMHG | OXYGEN SATURATION: 96 % | HEIGHT: 69 IN

## 2022-04-27 DIAGNOSIS — I10 PRIMARY HYPERTENSION: Primary | ICD-10-CM

## 2022-04-27 DIAGNOSIS — E11.9 TYPE 2 DIABETES MELLITUS WITHOUT COMPLICATION, WITHOUT LONG-TERM CURRENT USE OF INSULIN (HCC): ICD-10-CM

## 2022-04-27 PROCEDURE — 99213 OFFICE O/P EST LOW 20 MIN: CPT | Performed by: NURSE PRACTITIONER

## 2022-04-27 NOTE — PROGRESS NOTES
Birgit Mcgarry presents today for   Chief Complaint   Patient presents with    Follow-up     2 week follow up        Is someone accompanying this pt? No     Is the patient using any DME equipment during OV? No     Depression Screening:  3 most recent PHQ Screens 4/27/2022   Little interest or pleasure in doing things Not at all   Feeling down, depressed, irritable, or hopeless Not at all   Total Score PHQ 2 0       Learning Assessment:  No flowsheet data found. Fall Risk  No flowsheet data found. ADL  No flowsheet data found. Travel Screening:    Travel Screening     Question   Response    In the last 10 days, have you been in contact with someone who was confirmed or suspected to have Coronavirus/COVID-19? No / Unsure    Have you had a COVID-19 viral test in the last 10 days? Yes - Negative result    Do you have any of the following new or worsening symptoms? None of these    Have you traveled internationally or domestically in the last month? No      Travel History   Travel since 03/27/22    No documented travel since 03/27/22         Health Maintenance reviewed and discussed and ordered per Provider. Health Maintenance Due   Topic Date Due    Pneumococcal 0-64 years (1 - PCV) Never done    Foot Exam Q1  Never done    Eye Exam Retinal or Dilated  Never done    A1C test (Diabetic or Prediabetic)  09/02/2011    Colorectal Cancer Screening Combo  Never done   . Coordination of Care:  1. \"Have you been to the ER, urgent care clinic since your last visit? Hospitalized since your last visit? \" No    2. \"Have you seen or consulted any other health care providers outside of the [address] [place] [address] since your last visit? \" No     3. For patients aged 39-70: Has the patient had a colonoscopy? No patient states he does not want a colonoscopy right now     If the patient is female:    4. For patients aged 41-77: Has the patient had a mammogram within the past 2 years? No    5.  For patients aged 21-65: Has the patient had a pap smear?  No

## 2022-04-27 NOTE — PROGRESS NOTES
MEET Pinedo is a 47 y.o. male and presents today for Follow-up (2 week follow up )    Allergies    No Known Allergies     Medications    Current Outpatient Medications   Medication Sig Dispense    atorvastatin (LIPITOR) 40 mg tablet Take 1 Tablet by mouth daily. At bedtime 90 Tablet    losartan (COZAAR) 25 mg tablet Take 1 Tablet by mouth daily. 90 Tablet    metFORMIN (GLUCOPHAGE) 1,000 mg tablet Take 1 Tablet by mouth two (2) times daily (with meals). 90 Tablet    levothyroxine (SYNTHROID) 25 mcg tablet Take 1 Tablet by mouth Daily (before breakfast). 90 Tablet    lancets misc Check Blood glucose 2-4 x days 1 Each    albuterol (ProAir HFA) 90 mcg/actuation inhaler Take 2 Puffs by inhalation every four (4) hours as needed for Wheezing. 2 Inhaler    glucose blood VI test strips (blood glucose test) strip Strips for Contour next EZ (Patient not taking: Reported on 4/27/2022) 100 Strip     No current facility-administered medications for this visit.         Health Maintenance    Health Maintenance Due   Topic Date Due    Pneumococcal 0-64 years (1 - PCV) Never done    Foot Exam Q1  Never done    Eye Exam Retinal or Dilated  Never done    A1C test (Diabetic or Prediabetic)  09/02/2011    Colorectal Cancer Screening Combo  Never done        Problem List    Patient Active Problem List    Diagnosis Date Noted    Type 2 diabetes mellitus without complication, without long-term current use of insulin [facility] 04/01/2022    Non-compliance 02/25/2022    Primary hypertension 02/25/2022        Family Hx    Family History   Problem Relation Age of Onset    COPD Mother     Lung Cancer Father     Diabetes Maternal Grandmother         Social Hx    Social History     Socioeconomic History    Marital status:      Spouse name: Not on file    Number of children: Not on file    Years of education: Not on file    Highest education level: Not on file   Occupational History    Not on file   Tobacco Use  Smoking status: Never Smoker    Smokeless tobacco: Never Used   Vaping Use    Vaping Use: Never used   Substance and Sexual Activity    Alcohol use: Never    Drug use: Never    Sexual activity: Yes   Other Topics Concern    Not on file   Social History Narrative    Not on file     Social Determinants of Health     Financial Resource Strain:     Difficulty of Paying Living Expenses: Not on file   Food Insecurity:     Worried About Running Out of Food in the Last Year: Not on file    Naseem of Food in the Last Year: Not on file   Transportation Needs:     Lack of Transportation (Medical): Not on file    Lack of Transportation (Non-Medical): Not on file   Physical Activity:     Days of Exercise per Week: Not on file    Minutes of Exercise per Session: Not on file   Stress:     Feeling of Stress : Not on file   Social Connections:     Frequency of Communication with Friends and Family: Not on file    Frequency of Social Gatherings with Friends and Family: Not on file    Attends Mosque Services: Not on file    Active Member of [address] [place] [address] or Organizations: Not on file    Attends Club or Organization Meetings: Not on file    Marital Status: Not on file   Intimate Partner Violence:     Fear of Current or Ex-Partner: Not on file    Emotionally Abused: Not on file    Physically Abused: Not on file    Sexually Abused: Not on file   Housing Stability:     Unable to Pay for Housing in the Last Year: Not on file    Number of [place] in the Last Year: Not on file    Unstable Housing in the Last Year: Not on file        Surgical Hx    History reviewed. No pertinent surgical history. Vitals    Visit Vitals  BP (!) 151/86   Pulse 81   Resp 16   Ht 5' 9\" (1.753 m)   Wt 209 lb 14.4 oz (95.2 kg)   SpO2 96%   BMI 31.00 kg/m²        ROS    Review of Systems   All other systems reviewed and are negative. Physical Exam    Physical Exam  Constitutional:       Appearance: Normal appearance. Cardiovascular:      Rate and Rhythm: Normal rate and regular rhythm. Pulses: Normal pulses. Heart sounds: Normal heart sounds. Pulmonary:      Effort: Pulmonary effort is normal.      Breath sounds: Normal breath sounds. Abdominal:      General: Bowel sounds are normal.   Skin:     Capillary Refill: Capillary refill takes 2 to 3 seconds. Neurological:      Mental Status: He is alert and oriented to person, place, and time. Assessment/Plan    Diagnoses and all orders for this visit:    1. Primary hypertension  -BP still slightly elevated. Reviewed Dash diet. 2. Type 2 diabetes mellitus without complication, without long-term current use of insulin ([facility] [address]  -He is still working on lifestyle modifications including his diet and exercise. He reports he is remembering more to take his medication as prescribed. Reports he is unable to go test blood sugars due to not having strips. Prescription was sent to the pharmacy on prior visit told him to have pharmacy call us if this continue to be an issue. Health Maintenance Items reviewed with patient as noted.     [facility], NP-C

## 2022-04-27 NOTE — PATIENT INSTRUCTIONS
High Blood Pressure: Care Instructions  Overview     It's normal for blood pressure to go up and down throughout the day. But if it stays up, you have high blood pressure. Another name for high blood pressure is hypertension. Despite what a lot of people think, high blood pressure usually doesn't cause headaches or make you feel dizzy or lightheaded. It usually has no symptoms. But it does increase your risk of stroke, heart attack, and other problems. You and your doctor will talk about your risks of these problems based on your blood pressure. Your doctor will give you a goal for your blood pressure. Your goal will be based on your health and your age. Lifestyle changes, such as eating healthy and being active, are always important to help lower blood pressure. You might also take medicine to reach your blood pressure goal.  Follow-up care is a key part of your treatment and safety. Be sure to make and go to all appointments, and call your doctor if you are having problems. It's also a good idea to know your test results and keep a list of the medicines you take. How can you care for yourself at home? Medical treatment  · If you stop taking your medicine, your blood pressure will go back up. You may take one or more types of medicine to lower your blood pressure. Be safe with medicines. Take your medicine exactly as prescribed. Call your doctor if you think you are having a problem with your medicine. · Talk to your doctor before you start taking aspirin every day. Aspirin can help certain people lower their risk of a heart attack or stroke. But taking aspirin isn't right for everyone, because it can cause serious bleeding. · See your doctor regularly. You may need to see the doctor more often at first or until your blood pressure comes down. · If you are taking blood pressure medicine, talk to your doctor before you take decongestants or anti-inflammatory medicine, such as ibuprofen.  Some of these medicines can raise blood pressure. · Learn how to check your blood pressure at home. Lifestyle changes  · Stay at a healthy weight. This is especially important if you put on weight around the waist. Losing even 10 pounds can help you lower your blood pressure. · If your doctor recommends it, get more exercise. Walking is a good choice. Bit by bit, increase the amount you walk every day. Try for at least 30 minutes on most days of the week. You also may want to swim, bike, or do other activities. · Avoid or limit alcohol. Talk to your doctor about whether you can drink any alcohol. · Try to limit how much sodium you eat to less than 2,300 milligrams (mg) a day. Your doctor may ask you to try to eat less than 1,500 mg a day. · Eat plenty of fruits (such as bananas and oranges), vegetables, legumes, whole grains, and low-fat dairy products. · Lower the amount of saturated fat in your diet. Saturated fat is found in animal products such as milk, cheese, and meat. Limiting these foods may help you lose weight and also lower your risk for heart disease. · Do not smoke. Smoking increases your risk for heart attack and stroke. If you need help quitting, talk to your doctor about stop-smoking programs and medicines. These can increase your chances of quitting for good. When should you call for help? Call 911  anytime you think you may need emergency care. This may mean having symptoms that suggest that your blood pressure is causing a serious heart or blood vessel problem. Your blood pressure may be over 180/120. For example, call 911 if:    · You have symptoms of a heart attack. These may include:  ? Chest pain or pressure, or a strange feeling in the chest.  ? Sweating. ? Shortness of breath. ? Nausea or vomiting. ? Pain, pressure, or a strange feeling in the back, neck, jaw, or upper belly or in one or both shoulders or arms. ? Lightheadedness or sudden weakness.   ? A fast or irregular heartbeat.     · You have symptoms of a stroke. These may include:  ? Sudden numbness, tingling, weakness, or loss of movement in your face, arm, or leg, especially on only one side of your body. ? Sudden vision changes. ? Sudden trouble speaking. ? Sudden confusion or trouble understanding simple statements. ? Sudden problems with walking or balance. ? A sudden, severe headache that is different from past headaches.     · You have severe back or belly pain. Do not wait until your blood pressure comes down on its own. Get help right away. Call your doctor now or seek immediate care if:    · Your blood pressure is much higher than normal (such as 180/120 or higher), but you don't have symptoms.     · You think high blood pressure is causing symptoms, such as:  ? Severe headache.  ? Blurry vision. Watch closely for changes in your health, and be sure to contact your doctor if:    · Your blood pressure measures higher than your doctor recommends at least 2 times. That means the top number is higher or the bottom number is higher, or both.     · You think you may be having side effects from your blood pressure medicine. Where can you learn more? Go to http://www.gray.com/  Enter [id number] in the search box to learn more about \"High Blood Pressure: Care Instructions. \"  Current as of: January 10, 2022               Content Version: 13.2  © 2006-2022 [employer]. Care instructions adapted under license by [employer] (which disclaims liability or warranty for this information). If you have questions about a medical condition or this instruction, always ask your healthcare professional. [place] [address] any warranty or liability for your use of this information.

## 2022-11-16 ENCOUNTER — APPOINTMENT (OUTPATIENT)
Dept: GENERAL RADIOLOGY | Age: 52
End: 2022-11-16
Attending: FAMILY MEDICINE
Payer: COMMERCIAL

## 2022-11-16 ENCOUNTER — APPOINTMENT (OUTPATIENT)
Dept: CT IMAGING | Age: 52
End: 2022-11-16
Attending: FAMILY MEDICINE
Payer: COMMERCIAL

## 2022-11-16 ENCOUNTER — HOSPITAL ENCOUNTER (OUTPATIENT)
Age: 52
Setting detail: OBSERVATION
Discharge: HOME | End: 2022-11-17
Attending: FAMILY MEDICINE | Admitting: INTERNAL MEDICINE
Payer: COMMERCIAL

## 2022-11-16 DIAGNOSIS — K85.90 ACUTE PANCREATITIS, UNSPECIFIED COMPLICATION STATUS, UNSPECIFIED PANCREATITIS TYPE: ICD-10-CM

## 2022-11-16 DIAGNOSIS — R07.9 CHEST PAIN, UNSPECIFIED TYPE: Primary | ICD-10-CM

## 2022-11-16 DIAGNOSIS — Z91.199 NON COMPLIANCE WITH MEDICAL TREATMENT: ICD-10-CM

## 2022-11-16 PROBLEM — E03.9 HYPOTHYROIDISM: Status: ACTIVE | Noted: 2022-11-16

## 2022-11-16 LAB
ALBUMIN SERPL-MCNC: 3.9 G/DL (ref 3.4–5)
ALBUMIN/GLOB SERPL: 1 {RATIO} (ref 0.8–1.7)
ALP SERPL-CCNC: 86 U/L (ref 45–117)
ALT SERPL-CCNC: 26 U/L (ref 16–61)
ANION GAP SERPL CALC-SCNC: 9 MMOL/L (ref 3–18)
AST SERPL W P-5'-P-CCNC: 11 U/L (ref 10–38)
BASOPHILS # BLD: 0 K/UL (ref 0–0.1)
BASOPHILS NFR BLD: 1 % (ref 0–2)
BILIRUB SERPL-MCNC: 0.3 MG/DL (ref 0.2–1)
BUN SERPL-MCNC: 15 MG/DL (ref 7–18)
BUN/CREAT SERPL: 21 (ref 12–20)
CA-I BLD-MCNC: 9 MG/DL (ref 8.5–10.1)
CHLORIDE SERPL-SCNC: 97 MMOL/L (ref 100–111)
CO2 SERPL-SCNC: 27 MMOL/L (ref 21–32)
CREAT SERPL-MCNC: 0.73 MG/DL (ref 0.6–1.3)
DIFFERENTIAL METHOD BLD: NORMAL
EOSINOPHIL # BLD: 0.2 K/UL (ref 0–0.4)
EOSINOPHIL NFR BLD: 3 % (ref 0–5)
ERYTHROCYTE [DISTWIDTH] IN BLOOD BY AUTOMATED COUNT: 12.3 % (ref 11.6–14.5)
GLOBULIN SER CALC-MCNC: 3.9 G/DL (ref 2–4)
GLUCOSE BLD STRIP.AUTO-MCNC: 366 MG/DL (ref 70–110)
GLUCOSE SERPL-MCNC: 284 MG/DL (ref 74–99)
HCT VFR BLD AUTO: 45 % (ref 36–48)
HGB BLD-MCNC: 15.2 G/DL (ref 13–16)
IMM GRANULOCYTES # BLD AUTO: 0 K/UL (ref 0–0.04)
IMM GRANULOCYTES NFR BLD AUTO: 0 % (ref 0–0.5)
LIPASE SERPL-CCNC: 1106 U/L (ref 73–393)
LYMPHOCYTES # BLD: 2.3 K/UL (ref 0.9–3.6)
LYMPHOCYTES NFR BLD: 38 % (ref 21–52)
MAGNESIUM SERPL-MCNC: 2.3 MG/DL (ref 1.6–2.6)
MCH RBC QN AUTO: 28.6 PG (ref 24–34)
MCHC RBC AUTO-ENTMCNC: 33.8 G/DL (ref 31–37)
MCV RBC AUTO: 84.6 FL (ref 78–100)
MONOCYTES # BLD: 0.6 K/UL (ref 0.05–1.2)
MONOCYTES NFR BLD: 10 % (ref 3–10)
NEUTS SEG # BLD: 3 K/UL (ref 1.8–8)
NEUTS SEG NFR BLD: 48 % (ref 40–73)
NRBC # BLD: 0 K/UL (ref 0–0.01)
NRBC BLD-RTO: 0 PER 100 WBC
PERFORMED BY, TECHID: ABNORMAL
PLATELET # BLD AUTO: 250 K/UL (ref 135–420)
PMV BLD AUTO: 9.7 FL (ref 9.2–11.8)
POTASSIUM SERPL-SCNC: 4.1 MMOL/L (ref 3.5–5.5)
PROT SERPL-MCNC: 7.8 G/DL (ref 6.4–8.2)
RBC # BLD AUTO: 5.32 M/UL (ref 4.35–5.65)
SODIUM SERPL-SCNC: 133 MMOL/L (ref 136–145)
TROPONIN-HIGH SENSITIVITY: 41 NG/L (ref 0–78)
TROPONIN-HIGH SENSITIVITY: 44 NG/L (ref 0–78)
TSH SERPL DL<=0.05 MIU/L-ACNC: 6.67 UIU/ML (ref 0.36–3.74)
WBC # BLD AUTO: 6 K/UL (ref 4.6–13.2)

## 2022-11-16 PROCEDURE — 83735 ASSAY OF MAGNESIUM: CPT

## 2022-11-16 PROCEDURE — 74011000636 HC RX REV CODE- 636: Performed by: FAMILY MEDICINE

## 2022-11-16 PROCEDURE — 71045 X-RAY EXAM CHEST 1 VIEW: CPT

## 2022-11-16 PROCEDURE — 74011250636 HC RX REV CODE- 250/636: Performed by: NURSE PRACTITIONER

## 2022-11-16 PROCEDURE — 99285 EMERGENCY DEPT VISIT HI MDM: CPT

## 2022-11-16 PROCEDURE — 83690 ASSAY OF LIPASE: CPT

## 2022-11-16 PROCEDURE — 82962 GLUCOSE BLOOD TEST: CPT

## 2022-11-16 PROCEDURE — 85025 COMPLETE CBC W/AUTO DIFF WBC: CPT

## 2022-11-16 PROCEDURE — 84443 ASSAY THYROID STIM HORMONE: CPT

## 2022-11-16 PROCEDURE — 74011636637 HC RX REV CODE- 636/637: Performed by: NURSE PRACTITIONER

## 2022-11-16 PROCEDURE — 80053 COMPREHEN METABOLIC PANEL: CPT

## 2022-11-16 PROCEDURE — 36415 COLL VENOUS BLD VENIPUNCTURE: CPT

## 2022-11-16 PROCEDURE — 84484 ASSAY OF TROPONIN QUANT: CPT

## 2022-11-16 PROCEDURE — 93005 ELECTROCARDIOGRAM TRACING: CPT

## 2022-11-16 PROCEDURE — G0378 HOSPITAL OBSERVATION PER HR: HCPCS

## 2022-11-16 PROCEDURE — 74011250637 HC RX REV CODE- 250/637: Performed by: FAMILY MEDICINE

## 2022-11-16 PROCEDURE — 74177 CT ABD & PELVIS W/CONTRAST: CPT

## 2022-11-16 PROCEDURE — 74011000250 HC RX REV CODE- 250: Performed by: NURSE PRACTITIONER

## 2022-11-16 PROCEDURE — 74011250636 HC RX REV CODE- 250/636: Performed by: FAMILY MEDICINE

## 2022-11-16 RX ORDER — ENOXAPARIN SODIUM 100 MG/ML
40 INJECTION SUBCUTANEOUS DAILY
Status: DISCONTINUED | OUTPATIENT
Start: 2022-11-17 | End: 2022-11-17 | Stop reason: HOSPADM

## 2022-11-16 RX ORDER — ONDANSETRON 2 MG/ML
4 INJECTION INTRAMUSCULAR; INTRAVENOUS
Status: DISCONTINUED | OUTPATIENT
Start: 2022-11-16 | End: 2022-11-17 | Stop reason: HOSPADM

## 2022-11-16 RX ORDER — GUAIFENESIN 100 MG/5ML
162 LIQUID (ML) ORAL
Status: COMPLETED | OUTPATIENT
Start: 2022-11-16 | End: 2022-11-16

## 2022-11-16 RX ORDER — ONDANSETRON 4 MG/1
4 TABLET, ORALLY DISINTEGRATING ORAL
Status: DISCONTINUED | OUTPATIENT
Start: 2022-11-16 | End: 2022-11-17 | Stop reason: HOSPADM

## 2022-11-16 RX ORDER — SODIUM CHLORIDE 0.9 % (FLUSH) 0.9 %
5-40 SYRINGE (ML) INJECTION AS NEEDED
Status: DISCONTINUED | OUTPATIENT
Start: 2022-11-16 | End: 2022-11-17 | Stop reason: HOSPADM

## 2022-11-16 RX ORDER — POLYETHYLENE GLYCOL 3350 17 G/17G
17 POWDER, FOR SOLUTION ORAL DAILY PRN
Status: DISCONTINUED | OUTPATIENT
Start: 2022-11-16 | End: 2022-11-17 | Stop reason: HOSPADM

## 2022-11-16 RX ORDER — ACETAMINOPHEN 650 MG/1
650 SUPPOSITORY RECTAL
Status: DISCONTINUED | OUTPATIENT
Start: 2022-11-16 | End: 2022-11-17 | Stop reason: HOSPADM

## 2022-11-16 RX ORDER — METOPROLOL TARTRATE 5 MG/5ML
5 INJECTION INTRAVENOUS
Status: DISCONTINUED | OUTPATIENT
Start: 2022-11-16 | End: 2022-11-16

## 2022-11-16 RX ORDER — ACETAMINOPHEN 325 MG/1
650 TABLET ORAL
Status: DISCONTINUED | OUTPATIENT
Start: 2022-11-16 | End: 2022-11-17 | Stop reason: HOSPADM

## 2022-11-16 RX ORDER — SODIUM CHLORIDE 0.9 % (FLUSH) 0.9 %
5-40 SYRINGE (ML) INJECTION EVERY 8 HOURS
Status: DISCONTINUED | OUTPATIENT
Start: 2022-11-16 | End: 2022-11-17 | Stop reason: HOSPADM

## 2022-11-16 RX ORDER — SODIUM CHLORIDE 9 MG/ML
75 INJECTION, SOLUTION INTRAVENOUS CONTINUOUS
Status: DISCONTINUED | OUTPATIENT
Start: 2022-11-16 | End: 2022-11-17 | Stop reason: HOSPADM

## 2022-11-16 RX ORDER — INSULIN LISPRO 100 [IU]/ML
INJECTION, SOLUTION INTRAVENOUS; SUBCUTANEOUS EVERY 6 HOURS
Status: DISCONTINUED | OUTPATIENT
Start: 2022-11-17 | End: 2022-11-17 | Stop reason: HOSPADM

## 2022-11-16 RX ORDER — HYDRALAZINE HYDROCHLORIDE 20 MG/ML
10 INJECTION INTRAMUSCULAR; INTRAVENOUS
Status: DISCONTINUED | OUTPATIENT
Start: 2022-11-16 | End: 2022-11-17 | Stop reason: HOSPADM

## 2022-11-16 RX ORDER — ASPIRIN 325 MG
325 TABLET ORAL
Status: DISCONTINUED | OUTPATIENT
Start: 2022-11-16 | End: 2022-11-16

## 2022-11-16 RX ADMIN — IOPAMIDOL 100 ML: 755 INJECTION, SOLUTION INTRAVENOUS at 19:35

## 2022-11-16 RX ADMIN — ASPIRIN 81 MG 162 MG: 81 TABLET ORAL at 19:14

## 2022-11-16 RX ADMIN — INSULIN LISPRO 10 UNITS: 100 INJECTION, SOLUTION INTRAVENOUS; SUBCUTANEOUS at 23:42

## 2022-11-16 RX ADMIN — SODIUM CHLORIDE 500 ML: 9 INJECTION, SOLUTION INTRAVENOUS at 19:14

## 2022-11-16 RX ADMIN — SODIUM CHLORIDE 75 ML/HR: 9 INJECTION, SOLUTION INTRAVENOUS at 23:15

## 2022-11-16 RX ADMIN — SODIUM CHLORIDE, PRESERVATIVE FREE 10 ML: 5 INJECTION INTRAVENOUS at 23:31

## 2022-11-16 NOTE — ED PROVIDER NOTES
Patient presents to the ED for intermittent chest pain that began yesterday, no inciting event. He denies similar pain in the past. Pain is intermittent, located under the left breast area, partially reproducible with palpation. He reports pain is better when he is working and worse when he sits down/still. Pain is not affected by deep breathing. He also reports left arm tingling but states that does not occur when the chest pain is occurring. He also reports posterior neck pain that will occur when neither the chest pain or arm tingling are happening. Yesterday he reports bilateral jaw tightness x1 episode that spontaneously resolved and has not occurred since then. He denies fever, chills, ENT symptoms, cough, SOB, n/v/d/c, abdominal pain, headache, dizziness or syncope. He states he is supposed to be on medication for diabetes, HTN, HLD, and thyroid but does not take any of it and hasn't for about 3mo       Past Medical History:   Diagnosis Date    Diabetes ([address]     Hyperlipemia     Hypertension     Hypothyroid        History reviewed. No pertinent surgical history.       Family History:   Problem Relation Age of Onset    COPD Mother     Lung Cancer Father     Diabetes Maternal Grandmother        Social History     Socioeconomic History    Marital status:      Spouse name: Not on file    Number of children: Not on file    Years of education: Not on file    Highest education level: Not on file   Occupational History    Not on file   Tobacco Use    Smoking status: Never    Smokeless tobacco: Never   Vaping Use    Vaping Use: Never used   Substance and Sexual Activity    Alcohol use: Never    Drug use: Never    Sexual activity: Yes   Other Topics Concern    Not on file   Social History Narrative    Not on file     Social Determinants of Health     Financial Resource Strain: Not on file   Food Insecurity: Not on file   Transportation Needs: Not on file   Physical Activity: Not on file   Stress: Not on file Social Connections: Not on file   Intimate Partner Violence: Not on file   Housing Stability: Not on file         ALLERGIES: Patient has no known allergies. Review of Systems   Constitutional: Negative. HENT: Negative. Respiratory: Negative. Cardiovascular:  Positive for chest pain. Negative for palpitations and leg swelling. Genitourinary: Negative. Musculoskeletal:  Positive for neck pain. Negative for neck stiffness. Neurological:  Positive for numbness. All other systems reviewed and are negative. Vitals:    11/16/22 1755   BP: (!) 179/91   Pulse: 70   Resp: 16   Temp: 98.5 °F (36.9 °C)   SpO2: 99%   Weight: 102.1 kg (225 lb)   Height: 5' 9\" (1.753 m)            Physical Exam  Vitals and nursing note reviewed. Constitutional:       General: He is not in acute distress. Appearance: He is well-developed. He is obese. He is not ill-appearing, toxic-appearing or diaphoretic. HENT:      Head: Normocephalic and atraumatic. Eyes:      Extraocular Movements: Extraocular movements intact. Neck:      Vascular: No JVD. Comments: Mild paraspinal muscle TTP with minimal spasm  Cardiovascular:      Rate and Rhythm: Normal rate and regular rhythm. Pulses:           Radial pulses are 2+ on the right side and 2+ on the left side. Posterior tibial pulses are 2+ on the right side and 2+ on the left side. Heart sounds: No friction rub. No gallop. Pulmonary:      Effort: Pulmonary effort is normal. No tachypnea, accessory muscle usage or respiratory distress. Breath sounds: Normal breath sounds. No stridor. Chest:      Chest wall: Tenderness present. No deformity, crepitus or edema. Comments: Mild TTP that partially reproduces pain  Abdominal:      General: Bowel sounds are normal.      Palpations: Abdomen is soft. There is no mass. Tenderness: There is abdominal tenderness.           Comments: TTP, pulsatile mass not appreciated Musculoskeletal:      Cervical back: Neck supple. Right lower leg: No tenderness. No edema. Left lower leg: No tenderness. No edema. Lymphadenopathy:      Cervical: No cervical adenopathy. Skin:     General: Skin is warm and dry. Neurological:      General: No focal deficit present. Mental Status: He is alert and oriented to person, place, and time. Motor: No weakness.         MDM  Risk of Complications, Morbidity, and/or Mortality  General comments: 1934 Sign out given to Dr. Nelda Yu, awaiting CT scan           EKG    Date/Time: 11/16/2022 5:59 PM  Performed by: Liberty Mccabe DO  Authorized by: Liberty Mccabe DO     ECG reviewed by ED Physician in the absence of a cardiologist: yes    Interpretation:     Interpretation: abnormal    Quality:     Tracing quality:  Limited by artifact  Rate:     ECG rate:  68    ECG rate assessment: normal    Rhythm:     Rhythm: sinus rhythm    Ectopy:     Ectopy: none    ST segments:     ST segments:  Non-specific    Details:  1-2mm elevation in v2 only  Comments:      , Qtc 441, limited by artifact

## 2022-11-17 ENCOUNTER — APPOINTMENT (OUTPATIENT)
Dept: NON INVASIVE DIAGNOSTICS | Age: 52
End: 2022-11-17
Attending: NURSE PRACTITIONER
Payer: COMMERCIAL

## 2022-11-17 VITALS
DIASTOLIC BLOOD PRESSURE: 95 MMHG | OXYGEN SATURATION: 98 % | SYSTOLIC BLOOD PRESSURE: 118 MMHG | TEMPERATURE: 98 F | BODY MASS INDEX: 30.36 KG/M2 | WEIGHT: 205 LBS | HEIGHT: 69 IN | RESPIRATION RATE: 16 BRPM | HEART RATE: 75 BPM

## 2022-11-17 LAB
ALBUMIN SERPL-MCNC: 3.2 G/DL (ref 3.4–5)
ALBUMIN/GLOB SERPL: 1 {RATIO} (ref 0.8–1.7)
ALP SERPL-CCNC: 79 U/L (ref 45–117)
ALT SERPL-CCNC: 24 U/L (ref 16–61)
ANION GAP SERPL CALC-SCNC: 8 MMOL/L (ref 3–18)
AST SERPL W P-5'-P-CCNC: 12 U/L (ref 10–38)
BASOPHILS # BLD: 0 K/UL (ref 0–0.1)
BASOPHILS NFR BLD: 0 % (ref 0–2)
BILIRUB SERPL-MCNC: 0.2 MG/DL (ref 0.2–1)
BUN SERPL-MCNC: 15 MG/DL (ref 7–18)
BUN/CREAT SERPL: 21 (ref 12–20)
CA-I BLD-MCNC: 8.5 MG/DL (ref 8.5–10.1)
CHLORIDE SERPL-SCNC: 102 MMOL/L (ref 100–111)
CO2 SERPL-SCNC: 26 MMOL/L (ref 21–32)
CREAT SERPL-MCNC: 0.7 MG/DL (ref 0.6–1.3)
DIFFERENTIAL METHOD BLD: NORMAL
ECHO AO ASC DIAM: 3.2 CM
ECHO AO ASCENDING AORTA INDEX: 1.53 CM/M2
ECHO AO ROOT DIAM: 3 CM
ECHO AO ROOT INDEX: 1.44 CM/M2
ECHO AR MAX VEL PISA: 3.1 M/S
ECHO AV AREA PEAK VELOCITY: 3.3 CM2
ECHO AV AREA VTI: 3.7 CM2
ECHO AV AREA/BSA PEAK VELOCITY: 1.6 CM2/M2
ECHO AV AREA/BSA VTI: 1.8 CM2/M2
ECHO AV MEAN GRADIENT: 6 MMHG
ECHO AV MEAN VELOCITY: 1.2 M/S
ECHO AV PEAK GRADIENT: 9 MMHG
ECHO AV PEAK VELOCITY: 1.5 M/S
ECHO AV REGURGITANT PHT: 789 MS
ECHO AV VELOCITY RATIO: 0.87
ECHO AV VTI: 30.5 CM
ECHO EST RA PRESSURE: 3 MMHG
ECHO IVC PROX: 1.8 CM
ECHO LA AREA 2C: 25.5 CM2
ECHO LA AREA 4C: 25.5 CM2
ECHO LA DIAMETER INDEX: 1.91 CM/M2
ECHO LA DIAMETER: 4 CM
ECHO LA MAJOR AXIS: 6.8 CM
ECHO LA MINOR AXIS: 6.7 CM
ECHO LA TO AORTIC ROOT RATIO: 1.33
ECHO LA VOL BP: 80 ML (ref 18–58)
ECHO LA VOL/BSA BIPLANE: 38 ML/M2 (ref 16–34)
ECHO LV E' LATERAL VELOCITY: 8 CM/S
ECHO LV E' SEPTAL VELOCITY: 8 CM/S
ECHO LV EDV A2C: 71 ML
ECHO LV EDV A4C: 78 ML
ECHO LV EDV INDEX A4C: 37 ML/M2
ECHO LV EDV NDEX A2C: 34 ML/M2
ECHO LV EJECTION FRACTION A2C: 65 %
ECHO LV EJECTION FRACTION A4C: 62 %
ECHO LV EJECTION FRACTION BIPLANE: 63 % (ref 55–100)
ECHO LV ESV A2C: 25 ML
ECHO LV ESV A4C: 29 ML
ECHO LV ESV INDEX A2C: 12 ML/M2
ECHO LV ESV INDEX A4C: 14 ML/M2
ECHO LV FRACTIONAL SHORTENING: 31 % (ref 28–44)
ECHO LV INTERNAL DIMENSION DIASTOLE INDEX: 2.01 CM/M2
ECHO LV INTERNAL DIMENSION DIASTOLIC: 4.2 CM (ref 4.2–5.9)
ECHO LV INTERNAL DIMENSION SYSTOLIC INDEX: 1.39 CM/M2
ECHO LV INTERNAL DIMENSION SYSTOLIC: 2.9 CM
ECHO LV IVSD: 1.4 CM (ref 0.6–1)
ECHO LV MASS 2D: 224.3 G (ref 88–224)
ECHO LV MASS INDEX 2D: 107.3 G/M2 (ref 49–115)
ECHO LV POSTERIOR WALL DIASTOLIC: 1.4 CM (ref 0.6–1)
ECHO LV RELATIVE WALL THICKNESS RATIO: 0.67
ECHO LVOT AREA: 3.8 CM2
ECHO LVOT AV VTI INDEX: 0.97
ECHO LVOT DIAM: 2.2 CM
ECHO LVOT MEAN GRADIENT: 4 MMHG
ECHO LVOT PEAK GRADIENT: 7 MMHG
ECHO LVOT PEAK VELOCITY: 1.3 M/S
ECHO LVOT STROKE VOLUME INDEX: 53.6 ML/M2
ECHO LVOT SV: 112.1 ML
ECHO LVOT VTI: 29.5 CM
ECHO MV A VELOCITY: 0.51 M/S
ECHO MV AREA VTI: 5.7 CM2
ECHO MV E DECELERATION TIME (DT): 206 MS
ECHO MV E VELOCITY: 0.89 M/S
ECHO MV E/A RATIO: 1.75
ECHO MV E/E' LATERAL: 11.13
ECHO MV E/E' RATIO (AVERAGED): 11.13
ECHO MV E/E' SEPTAL: 11.13
ECHO MV LVOT VTI INDEX: 0.66
ECHO MV MAX VELOCITY: 1 M/S
ECHO MV MEAN GRADIENT: 1 MMHG
ECHO MV MEAN VELOCITY: 0.5 M/S
ECHO MV PEAK GRADIENT: 4 MMHG
ECHO MV VTI: 19.6 CM
ECHO PV MAX VELOCITY: 1.2 M/S
ECHO PV PEAK GRADIENT: 5 MMHG
ECHO RA AREA 4C: 16 CM2
ECHO RA END SYSTOLIC VOLUME APICAL 4 CHAMBER INDEX BSA: 18 ML/M2
ECHO RA VOLUME: 38 ML
ECHO RIGHT VENTRICULAR SYSTOLIC PRESSURE (RVSP): 30 MMHG
ECHO RV BASAL DIMENSION: 2.8 CM
ECHO RV LONGITUDINAL DIMENSION: 6.7 CM
ECHO RV MID DIMENSION: 2.4 CM
ECHO RV TAPSE: 1.8 CM (ref 1.7–?)
ECHO TV MAX VELOCITY: 26 M/S
ECHO TV REGURGITANT MAX VELOCITY: 2.61 M/S
ECHO TV REGURGITANT PEAK GRADIENT: 20 MMHG
EOSINOPHIL # BLD: 0.2 K/UL (ref 0–0.4)
EOSINOPHIL NFR BLD: 3 % (ref 0–5)
ERYTHROCYTE [DISTWIDTH] IN BLOOD BY AUTOMATED COUNT: 12.5 % (ref 11.6–14.5)
EST. AVERAGE GLUCOSE BLD GHB EST-MCNC: 295 MG/DL
GLOBULIN SER CALC-MCNC: 3.3 G/DL (ref 2–4)
GLUCOSE BLD STRIP.AUTO-MCNC: 249 MG/DL (ref 70–110)
GLUCOSE BLD STRIP.AUTO-MCNC: 267 MG/DL (ref 70–110)
GLUCOSE SERPL-MCNC: 295 MG/DL (ref 74–99)
HBA1C MFR BLD: 11.9 % (ref 4.2–5.6)
HCT VFR BLD AUTO: 41.5 % (ref 36–48)
HGB BLD-MCNC: 14 G/DL (ref 13–16)
IMM GRANULOCYTES # BLD AUTO: 0 K/UL (ref 0–0.04)
IMM GRANULOCYTES NFR BLD AUTO: 0 % (ref 0–0.5)
LIPASE SERPL-CCNC: 256 U/L (ref 73–393)
LYMPHOCYTES # BLD: 2.1 K/UL (ref 0.9–3.6)
LYMPHOCYTES NFR BLD: 38 % (ref 21–52)
MAGNESIUM SERPL-MCNC: 2.3 MG/DL (ref 1.6–2.6)
MCH RBC QN AUTO: 28.5 PG (ref 24–34)
MCHC RBC AUTO-ENTMCNC: 33.7 G/DL (ref 31–37)
MCV RBC AUTO: 84.3 FL (ref 78–100)
MONOCYTES # BLD: 0.5 K/UL (ref 0.05–1.2)
MONOCYTES NFR BLD: 9 % (ref 3–10)
NEUTS SEG # BLD: 2.7 K/UL (ref 1.8–8)
NEUTS SEG NFR BLD: 50 % (ref 40–73)
NRBC # BLD: 0 K/UL (ref 0–0.01)
NRBC BLD-RTO: 0 PER 100 WBC
PERFORMED BY, TECHID: ABNORMAL
PERFORMED BY, TECHID: ABNORMAL
PLATELET # BLD AUTO: 229 K/UL (ref 135–420)
PMV BLD AUTO: 9.9 FL (ref 9.2–11.8)
POTASSIUM SERPL-SCNC: 3.7 MMOL/L (ref 3.5–5.5)
PROT SERPL-MCNC: 6.5 G/DL (ref 6.4–8.2)
RBC # BLD AUTO: 4.92 M/UL (ref 4.35–5.65)
SODIUM SERPL-SCNC: 136 MMOL/L (ref 136–145)
TROPONIN-HIGH SENSITIVITY: 38 NG/L (ref 0–78)
WBC # BLD AUTO: 5.4 K/UL (ref 4.6–13.2)

## 2022-11-17 PROCEDURE — 74011250636 HC RX REV CODE- 250/636: Performed by: NURSE PRACTITIONER

## 2022-11-17 PROCEDURE — 99204 OFFICE O/P NEW MOD 45 MIN: CPT | Performed by: INTERNAL MEDICINE

## 2022-11-17 PROCEDURE — 36415 COLL VENOUS BLD VENIPUNCTURE: CPT

## 2022-11-17 PROCEDURE — 93306 TTE W/DOPPLER COMPLETE: CPT

## 2022-11-17 PROCEDURE — 83690 ASSAY OF LIPASE: CPT

## 2022-11-17 PROCEDURE — 74011250637 HC RX REV CODE- 250/637: Performed by: NURSE PRACTITIONER

## 2022-11-17 PROCEDURE — 74011636637 HC RX REV CODE- 636/637: Performed by: NURSE PRACTITIONER

## 2022-11-17 PROCEDURE — 83735 ASSAY OF MAGNESIUM: CPT

## 2022-11-17 PROCEDURE — 85025 COMPLETE CBC W/AUTO DIFF WBC: CPT

## 2022-11-17 PROCEDURE — 83036 HEMOGLOBIN GLYCOSYLATED A1C: CPT

## 2022-11-17 PROCEDURE — 80053 COMPREHEN METABOLIC PANEL: CPT

## 2022-11-17 PROCEDURE — 82962 GLUCOSE BLOOD TEST: CPT

## 2022-11-17 PROCEDURE — 96372 THER/PROPH/DIAG INJ SC/IM: CPT

## 2022-11-17 PROCEDURE — 74011000250 HC RX REV CODE- 250: Performed by: NURSE PRACTITIONER

## 2022-11-17 PROCEDURE — 84484 ASSAY OF TROPONIN QUANT: CPT

## 2022-11-17 PROCEDURE — G0378 HOSPITAL OBSERVATION PER HR: HCPCS

## 2022-11-17 PROCEDURE — 93005 ELECTROCARDIOGRAM TRACING: CPT

## 2022-11-17 RX ORDER — AMLODIPINE BESYLATE 5 MG/1
2.5 TABLET ORAL DAILY
Status: DISCONTINUED | OUTPATIENT
Start: 2022-11-17 | End: 2022-11-17 | Stop reason: HOSPADM

## 2022-11-17 RX ORDER — ALBUTEROL SULFATE 90 UG/1
2 AEROSOL, METERED RESPIRATORY (INHALATION)
Status: DISCONTINUED | OUTPATIENT
Start: 2022-11-17 | End: 2022-11-17 | Stop reason: HOSPADM

## 2022-11-17 RX ORDER — INSULIN GLARGINE 100 [IU]/ML
14 INJECTION, SOLUTION SUBCUTANEOUS DAILY
Qty: 1 ADJUSTABLE DOSE PRE-FILLED PEN SYRINGE | Refills: 0 | Status: SHIPPED | OUTPATIENT
Start: 2022-11-17

## 2022-11-17 RX ORDER — LEVOTHYROXINE SODIUM 25 UG/1
25 TABLET ORAL
Status: DISCONTINUED | OUTPATIENT
Start: 2022-11-17 | End: 2022-11-17 | Stop reason: HOSPADM

## 2022-11-17 RX ORDER — LOSARTAN POTASSIUM 25 MG/1
25 TABLET ORAL DAILY
Qty: 30 TABLET | Refills: 0 | Status: SHIPPED | OUTPATIENT
Start: 2022-11-17

## 2022-11-17 RX ORDER — METFORMIN HYDROCHLORIDE 1000 MG/1
1000 TABLET ORAL 2 TIMES DAILY WITH MEALS
Qty: 60 TABLET | Refills: 0 | Status: SHIPPED | OUTPATIENT
Start: 2022-11-19

## 2022-11-17 RX ORDER — ATORVASTATIN CALCIUM 40 MG/1
40 TABLET, FILM COATED ORAL DAILY
Qty: 30 TABLET | Refills: 0 | Status: SHIPPED | OUTPATIENT
Start: 2022-11-17

## 2022-11-17 RX ADMIN — INSULIN LISPRO 4 UNITS: 100 INJECTION, SOLUTION INTRAVENOUS; SUBCUTANEOUS at 13:05

## 2022-11-17 RX ADMIN — INSULIN LISPRO 6 UNITS: 100 INJECTION, SOLUTION INTRAVENOUS; SUBCUTANEOUS at 05:35

## 2022-11-17 RX ADMIN — AMLODIPINE BESYLATE 2.5 MG: 5 TABLET ORAL at 13:53

## 2022-11-17 RX ADMIN — SODIUM CHLORIDE, PRESERVATIVE FREE 10 ML: 5 INJECTION INTRAVENOUS at 05:48

## 2022-11-17 RX ADMIN — SODIUM CHLORIDE, PRESERVATIVE FREE 10 ML: 5 INJECTION INTRAVENOUS at 14:05

## 2022-11-17 RX ADMIN — ENOXAPARIN SODIUM 40 MG: 100 INJECTION SUBCUTANEOUS at 08:28

## 2022-11-17 RX ADMIN — LEVOTHYROXINE SODIUM 25 MCG: 0.03 TABLET ORAL at 13:08

## 2022-11-17 NOTE — H&P
History and Physical    Subjective:     Karrie Jaime is a 47 y.o. male  has a past medical history of Diabetes ([facility] [address] Hyperlipemia, Hypertension, and Hypothyroid. Patient seen at bedside. Patient states he had what he explained to be upper abdominal pain chest pain all day yesterday. Patient states that it is gone now he has never had that pain before he did not have any other associated symptoms he did not take anything for the pain. No nausea vomiting no fevers reported patient is a diabetic and has hypertension states he is noncompliant due to some personal issues at home has not been taking his medication or following a diabetic diet. Patient states he stopped taking all of his blood pressure medicine and his metformin. Patient states he does not drink alcohol except for a sip every once in a while. On exam patient is tender in the mid to left upper quadrant of his abdomen. Lipase is elevated 1106. Troponin is negative TSH is elevated at 6.67 but patient does have a history of hypothyroidism. Patient sodium is mildly low he will get normal saline at 75 an hour. Patient will remain n.p.o. GI consult and if he experiences pain I will order him pain medicine. Given where his pain is and his symptoms I suspect this is a gastrointestinal issue and is not a cardiac issue but patient will still be admitted for evaluation by cardiology also cardiology consult and repeat troponin. Patient also did note a addiction to caffeine. Patient states he drinks Coke 0 constantly unsweetened tea, chocolate, anything that he can find with caffeine. Patient also states he keeps Diet Coke by his bedside and drinks it during the night due to his addiction to caffeine. Past Medical History:   Diagnosis Date    Diabetes ([facility] [address]     Hyperlipemia     Hypertension     Hypothyroid       History reviewed. No pertinent surgical history.   Family History   Problem Relation Age of Onset    COPD Mother     Lung Cancer Father     Diabetes Maternal Grandmother       Social History     Tobacco Use    Smoking status: Never    Smokeless tobacco: Never   Substance Use Topics    Alcohol use: Never       Prior to Admission medications    Medication Sig Start Date End Date Taking? Authorizing Provider   lancets misc Check Blood glucose 2-4 x days 3/16/22  Yes Muna Marcano NP-C   glucose blood VI test strips (blood glucose test) strip Strips for Contour next EZ 3/16/22  Yes Muna Marcano NP-C   albuterol (ProAir HFA) 90 mcg/actuation inhaler Take 2 Puffs by inhalation every four (4) hours as needed for Wheezing. 9/25/20  Yes Eva Abbott MD   atorvastatin (LIPITOR) 40 mg tablet Take 1 Tablet by mouth daily. At bedtime  Patient not taking: Reported on 11/16/2022 3/16/22   Muna Marcano NP-C   losartan (COZAAR) 25 mg tablet Take 1 Tablet by mouth daily. Patient not taking: Reported on 11/16/2022 3/16/22   Muna Marcano NP-C   metFORMIN (GLUCOPHAGE) 1,000 mg tablet Take 1 Tablet by mouth two (2) times daily (with meals). Patient not taking: Reported on 11/16/2022 3/16/22   Muna Marcano NP-C   levothyroxine (SYNTHROID) 25 mcg tablet Take 1 Tablet by mouth Daily (before breakfast). Patient not taking: Reported on 11/16/2022 3/16/22   Muna Marcano NP-C     No Known Allergies      Review of Systems   Constitutional:  Negative for fever. Respiratory:  Negative for shortness of breath. Cardiovascular:  Negative for chest pain. Gastrointestinal:  Positive for abdominal pain. Negative for nausea and vomiting. Neurological:  Negative for dizziness. All other systems reviewed and are negative. Objective:   VITALS:    Visit Vitals  BP (!) 167/84 (BP 1 Location: Left upper arm, BP Patient Position: At rest)   Pulse 80   Temp 98.4 °F (36.9 °C)   Resp 15   Ht 5' 9\" (1.753 m)   Wt 93 kg (205 lb 1.6 oz)   SpO2 100%   BMI 30.29 kg/m²       Physical Exam  Vitals and nursing note reviewed. Constitutional:       Appearance: He is well-developed. He is ill-appearing. HENT:      Head: Normocephalic and atraumatic. Eyes:      Conjunctiva/sclera: Conjunctivae normal.      Pupils: Pupils are equal, round, and reactive to light. Cardiovascular:      Rate and Rhythm: Normal rate and regular rhythm. Pulses: Normal pulses. Heart sounds: Normal heart sounds. Pulmonary:      Effort: Pulmonary effort is normal. No respiratory distress. Breath sounds: Normal breath sounds. No stridor. Abdominal:      General: Bowel sounds are normal. There is no distension. Palpations: Abdomen is soft. Tenderness: There is abdominal tenderness. Musculoskeletal:         General: Normal range of motion. Cervical back: Normal range of motion and neck supple. Skin:     General: Skin is warm and dry. Capillary Refill: Capillary refill takes less than 2 seconds. Coloration: Skin is not pale. Findings: No bruising. Neurological:      General: No focal deficit present. Mental Status: He is alert and oriented to person, place, and time. Psychiatric:         Mood and Affect: Mood normal.         Behavior: Behavior normal.         Thought Content:  Thought content normal.         Judgment: Judgment normal.       _______________________________________________________________________  Care Plan discussed with:    Comments   Patient X    Family      RN X    Care Manager                    Consultant:      _______________________________________________________________________  Expected  Disposition:   Home with Family X   HH/PT/OT/RN    SNF/LTC    JAMARCUS    ________________________________________________________________________  TOTAL TIME:  48 Minutes    Critical Care Provided     Minutes non procedure based      Comments    X Reviewed previous records   >50% of visit spent in counseling and coordination of care X Discussion with patient and/or family and questions answered       ________________________________________________________________________    Labs:  Recent Results (from the past 24 hour(s))   EKG, 12 LEAD, INITIAL    Collection Time: 11/16/22  5:59 PM   Result Value Ref Range    Ventricular Rate 68 BPM    Atrial Rate 69 BPM    P-R Interval 181 ms    QRS Duration 92 ms    Q-T Interval 414 ms    QTC Calculation (Bezet) 441 ms    Calculated P Axis 49 degrees    Calculated R Axis 13 degrees    Calculated T Axis -176 degrees    Diagnosis       Sinus rhythm  Abnormal R-wave progression, late transition  Abnormal T, consider ischemia, diffuse leads  Borderline ST elevation, anterior leads     CBC WITH AUTOMATED DIFF    Collection Time: 11/16/22  6:00 PM   Result Value Ref Range    WBC 6.0 4.6 - 13.2 K/uL    RBC 5.32 4.35 - 5.65 M/uL    HGB 15.2 13.0 - 16.0 g/dL    HCT 45.0 36.0 - 48.0 %    MCV 84.6 78.0 - 100.0 FL    MCH 28.6 24.0 - 34.0 PG    MCHC 33.8 31.0 - 37.0 g/dL    RDW 12.3 11.6 - 14.5 %    PLATELET 985 083 - 245 K/uL    MPV 9.7 9.2 - 11.8 FL    NRBC 0.0 0.0  WBC    ABSOLUTE NRBC 0.00 0.00 - 0.01 K/uL    NEUTROPHILS 48 40 - 73 %    LYMPHOCYTES 38 21 - 52 %    MONOCYTES 10 3 - 10 %    EOSINOPHILS 3 0 - 5 %    BASOPHILS 1 0 - 2 %    IMMATURE GRANULOCYTES 0 0 - 0.5 %    ABS. NEUTROPHILS 3.0 1.8 - 8.0 K/UL    ABS. LYMPHOCYTES 2.3 0.9 - 3.6 K/UL    ABS. MONOCYTES 0.6 0.05 - 1.2 K/UL    ABS. EOSINOPHILS 0.2 0.0 - 0.4 K/UL    ABS. BASOPHILS 0.0 0.0 - 0.1 K/UL    ABS. IMM.  GRANS. 0.0 0.00 - 0.04 K/UL    DF AUTOMATED     METABOLIC PANEL, COMPREHENSIVE    Collection Time: 11/16/22  6:00 PM   Result Value Ref Range    Sodium 133 (L) 136 - 145 mmol/L    Potassium 4.1 3.5 - 5.5 mmol/L    Chloride 97 (L) 100 - 111 mmol/L    CO2 27 21 - 32 mmol/L    Anion gap 9 3.0 - 18.0 mmol/L    Glucose 284 (H) 74 - 99 mg/dL    BUN 15 7 - 18 mg/dL    Creatinine 0.73 0.60 - 1.30 mg/dL    BUN/Creatinine ratio 21 (H) 12 - 20      eGFR >60 >60 ml/min/1.73m2    Calcium 9.0 8.5 - 10.1 mg/dL Bilirubin, total 0.3 0.2 - 1.0 mg/dL    AST (SGOT) 11 10 - 38 U/L    ALT (SGPT) 26 16 - 61 U/L    Alk. phosphatase 86 45 - 117 U/L    Protein, total 7.8 6.4 - 8.2 g/dL    Albumin 3.9 3.4 - 5.0 g/dL    Globulin 3.9 2.0 - 4.0 g/dL    A-G Ratio 1.0 0.8 - 1.7     TROPONIN-HIGH SENSITIVITY    Collection Time: 11/16/22  6:00 PM   Result Value Ref Range    Troponin-High Sensitivity 41 0 - 78 ng/L   MAGNESIUM    Collection Time: 11/16/22  6:00 PM   Result Value Ref Range    Magnesium 2.3 1.6 - 2.6 mg/dL   LIPASE    Collection Time: 11/16/22  6:00 PM   Result Value Ref Range    Lipase 1,106 (H) 73 - 393 U/L   TSH 3RD GENERATION    Collection Time: 11/16/22  6:00 PM   Result Value Ref Range    TSH 6.67 (H) 0.36 - 3.74 uIU/mL   TROPONIN-HIGH SENSITIVITY    Collection Time: 11/16/22  8:45 PM   Result Value Ref Range    Troponin-High Sensitivity 44 0 - 78 ng/L   GLUCOSE, POC    Collection Time: 11/16/22 11:35 PM   Result Value Ref Range    Glucose (POC) 366 (H) 70 - 110 mg/dL    Performed by Baudilio Mac        Imaging:  CT ABD PELV W CONT    Result Date: 11/16/2022  EXAM: CT of the abdomen and pelvis CLINICAL INDICATION/HISTORY: Chest pain, left upper quadrant abdominal pain COMPARISON: None. TECHNIQUE: Axial CT imaging of the abdomen and pelvis was performed with intravenous contrast. Multiplanar reformats were generated. One or more dose reduction techniques were used on this CT: automated exposure control, adjustment of the mAs and/or kVp according to patient size, and iterative reconstruction techniques. The specific techniques used on this CT exam have been documented in the patient's electronic medical record. Digital Imaging and Communications in Medicine (DICOM) format image data are available to nonaffiliated external healthcare facilities or entities on a secured, media free, reciprocally searchable basis with patient authorization for at least a 12-month period after this study.  _______________ FINDINGS: LOWER CHEST: Coronary artery calcifications. LIVER, BILIARY: Liver is normal. No biliary dilation. Gallbladder is unremarkable. PANCREAS: Normal. SPLEEN: Normal. ADRENALS: Normal. KIDNEYS/URETERS/BLADDER: Normal. PELVIC ORGANS: Unremarkable. LYMPH NODES: No enlarged lymph nodes. GASTROINTESTINAL TRACT: No bowel dilation or wall thickening. The appendix is normal. VASCULATURE: Mild atherosclerosis. BONES: No acute or aggressive osseous abnormalities identified. OTHER: None. _______________     No acute abnormalities identified in the abdomen to explain the patient's abdominal pain. XR CHEST PORT    Result Date: 11/16/2022  EXAM: CHEST RADIOGRAPH CLINICAL INDICATION/HISTORY: chest pain   > Additional: None COMPARISON: 12/1/2018 TECHNIQUE: Portable frontal view of the chest _______________ FINDINGS: SUPPORT DEVICES: None. HEART AND MEDIASTINUM: Heart size is normal. LUNGS AND PLEURAL SPACES: No focal consolidation, effusion, or pneumothorax. BONES AND SOFT TISSUES: Unremarkable. _______________     No active cardiopulmonary disease. Assessment & Plan:       Chest pain  Non specific chest pain/abd pain  Normal troponin  Elevated lipase  Abnormal ekg, t wave inversion  Repeat trop in am  Cardiology consul      Pancreatitis  Chest pain/abd pain  Elevated lipase  No hx of ETOH intake  + Diabetic on metformin  Hold metformin  NPO  NS at 75/hr  Gastroenterology consult    Hypothyroidism  This is a chronic problem  Continue Synthroid        Code Status: Full      Prophylaxis: Lovenox      Electronically Signed : Chewelah Meagan ENP-C, FNP-C, [address] voice recognition was used to generate this report, which may have resulted in some phonetic based errors in grammar and contents.  Even though attempts were made to correct all the mistakes, some may have been missed, and remained in the body of the document

## 2022-11-17 NOTE — CONSULTS
CARDIOLOGY CONSULTATION      REASON FOR CONSULT: Chest pain    REQUESTING PROVIDER: Silver Wesley NP    CHIEF COMPLAINT:  Chest pain    HISTORY OF PRESENT ILLNESS:  Todd Buckley is a 47y.o. year-old male with past medical history significant for hypertension, hyperlipidemia, diabetes, hypothyroidism, cardiac murmur who was evaluated today due to chest pain. He is not compliant with medications at home. He reports he was resting at work when he developed left sided heart twinging that lasted a few seconds at a time. He had the same twinges in the left arm and back of his neck. It was very mild but still concerned him. No associated symptoms. Never occurred with exertion. He spoke with the [facility] doctor who recommended he go to the ED. He is feeling fine now. He thinks he a normal echo several years ago but does not have an established cardiogist.    Records from hospital admission course thus far reviewed. Telemetry reviewed. INPATIENT MEDICATIONS:  Home medications reviewed.     Current Facility-Administered Medications:     albuterol (PROVENTIL HFA, VENTOLIN HFA, PROAIR HFA) inhaler 2 Puff, 2 Puff, Inhalation, Q4H PRN, Abigail EVANS, NP    levothyroxine (SYNTHROID) tablet 25 mcg, 25 mcg, Oral, ACB, Liz Arrieta A, NP    sodium chloride (NS) flush 5-40 mL, 5-40 mL, IntraVENous, Q8H, Liz Arrieta NP, 10 mL at 11/17/22 0548    sodium chloride (NS) flush 5-40 mL, 5-40 mL, IntraVENous, PRN, Abigail EVANS, NP    acetaminophen (TYLENOL) tablet 650 mg, 650 mg, Oral, Q6H PRN **OR** acetaminophen (TYLENOL) suppository 650 mg, 650 mg, Rectal, Q6H PRN, Abigail EVANS, NP    polyethylene glycol (MIRALAX) packet 17 g, 17 g, Oral, DAILY PRN, Abigail EVANS, NP    ondansetron (ZOFRAN ODT) tablet 4 mg, 4 mg, Oral, Q8H PRN **OR** ondansetron (ZOFRAN) injection 4 mg, 4 mg, IntraVENous, Q6H PRN, Abigail EVANS, NP    enoxaparin (LOVENOX) injection 40 mg, 40 mg, SubCUTAneous, DAILY, Romana Knudsen A, NP, 40 mg at 11/17/22 0828    0.9% sodium chloride infusion, 75 mL/hr, IntraVENous, CONTINUOUS, Romana Knudsen A, NP, Last Rate: 75 mL/hr at 11/16/22 2315, 75 mL/hr at 11/16/22 2315    insulin lispro (HUMALOG) injection, , SubCUTAneous, Q6H, Romana Knudsen A, NP, 6 Units at 11/17/22 0535    hydrALAZINE (APRESOLINE) 20 mg/mL injection 10 mg, 10 mg, IntraVENous, Q6H PRN, Marta Crenshaw NP     ALLERGIES:  Allergies reviewed with the patient,No Known Allergies . FAMILY HISTORY:  Family history reviewed. No history of CAD      SOCIAL HISTORY:  Notable for no tobacco use, no heavy alcohol or illicit drug use. REVIEW OF SYSTEMS:  Complete review of systems performed, pertinents noted above, all other systems are negative. PHYSICAL EXAMINATION:    General:  Alert and oriented, in NAD  Cardiovascular:  Regular rate and rhythm, faint murmur  Respiratory:  Clear to auscultation bilaterally  Abdomen:  Soft/NT  Extremities:  No LE edema    Visit Vitals  BP (!) 155/91   Pulse 69   Temp 97.8 °F (36.6 °C)   Resp 13   Ht 5' 9\" (1.753 m)   Wt 93 kg (205 lb)   SpO2 97%   BMI 30.27 kg/m²         Recent labs results and imaging reviewed . Discussed case with Dr. Zheng Cuellar and our impression and recommendations are as follows:  Chest pain, seems atypical, GI workup pending, trop 41-44-38, lipase elevated on admit  Echo ordered to assess cardiac structure and function  Repeat ECG today  If echo has normal ef and no wall motion abnormalities, can proceed with stress test as an OP in our office  Continue to control BP, DM  Hypertension, blood pressure above goal, start low dose amlodipine  Hyperlipidemia, continue diet and exercise efforts  Diabetes, needs better glycemic control    Thank you for involving us in the care of this patient. Please do not hesitate to call me or Dr. Zheng Cuellar if additional questions arise.     Ely Hernández NP  11/17/2022

## 2022-11-17 NOTE — PROGRESS NOTES
Discharge instructions administered and understood. IVL removed. To lobby via wheelchair and home via private vehicle.

## 2022-11-17 NOTE — ROUTINE PROCESS
TRANSFER - OUT REPORT:    Verbal report given to Itz Graham (name) on Kate Hemp  being transferred to ICU (unit) for routine progression of care       Report consisted of patients Situation, Background, Assessment and   Recommendations(SBAR). Information from the following report(s) SBAR, ED Summary, and MAR was reviewed with the receiving nurse. Lines:   Peripheral IV 11/16/22 Right Antecubital (Active)        Opportunity for questions and clarification was provided.       Patient transported with:   Monitor  Registered Nurse

## 2022-11-17 NOTE — CONSULTS
Gastroenterology Consult Note       Primary GI Physician: Tomas Welsh MD    Referring Provider: Hospitalist team    Consult Date:  11/17/2022    Admit Date:  11/16/2022    Chief Complaint: Elevated lipase    Subjective:     History of Present Illness:  Patient is a 47 y.o. male who is seen in consultation for evaluation for elevated lipase. The history is from the patient and their medical records. Patient came to the emergency room yesterday with complaints of intermittent chest pain for approximately 24 hours. The patient has never had this pain previously. The pain, according to his chart, it was intermittent, located under his left breast, partially reproducible with palpation. It was improved with working and movement, and worse when he sits down or lying still. Is not affected with deep breathing. He also reports some left arm tingling and posterior neck pain. He states in the last 2 or so weeks he has been lifting more boxes and doing more physical labor than he normally does at his job and her house at a local intermediate. The patient was admitted to the hospital and to date, troponin x3 has been negative. Initial evaluation revealed he has an elevated lipase of 1106. Repeat lipase is normal.  Liver function tests, CBC, and CMP and renal function are all normal.  CT scan of the abdomen and pelvis revealed: No abnormalities, normal pancreas, normal common bile duct, normal liver, and no other abnormalities. The patient still has his gallbladder. Previous labs shows he has had elevated PSA up to 6.71, triglycerides of 363, and cholesterol 310. In addition, Patient denies nausea, vomiting, fever, chills, abdominal pain, rectal bleeding, melena, reflux, dysphagia, weight loss, diarrhea, constipation, or change in bowel habits. Further questioning reveals he had some mild low epigastric pain on palpation on admission but only on palpation.   He reports he has been able to eat without any discomfort or symptoms. He and his wife state he has been under increased stress recently and wonders if this could be a factor. The patient has type 2 diabetes mellitus and admits he is not been that well controlled. He also drinks lots of Coke 0 and caffeine via coffee. He drinks minimal alcohol. Alcohol use-minimal.   Tobacco use- none. Last EGD-none. Last colonoscopy-none. Past medical history is significant for Type 2 diabetes mellitus, tension, hyperlipidemia, and hypothyroidism. PMH:  Past Medical History:   Diagnosis Date    Diabetes ([address]     Hyperlipemia     Hypertension     Hypothyroid        PSH:  History reviewed. No pertinent surgical history. Allergies:  No Known Allergies    Home Medications:  Prior to Admission medications    Medication Sig Start Date End Date Taking? Authorizing Provider   lancets misc Check Blood glucose 2-4 x days 3/16/22  Yes Muna Marcano NP-C   glucose blood VI test strips (blood glucose test) strip Strips for Contour next EZ 3/16/22  Yes Muna Marcano NP-C   albuterol (ProAir HFA) 90 mcg/actuation inhaler Take 2 Puffs by inhalation every four (4) hours as needed for Wheezing. 9/25/20  Yes Yefri Pedroza MD   atorvastatin (LIPITOR) 40 mg tablet Take 1 Tablet by mouth daily. At bedtime  Patient not taking: Reported on 11/16/2022 3/16/22   Muna Marcano NP-C   losartan (COZAAR) 25 mg tablet Take 1 Tablet by mouth daily. Patient not taking: Reported on 11/16/2022 3/16/22   Muna Marcano NP-C   metFORMIN (GLUCOPHAGE) 1,000 mg tablet Take 1 Tablet by mouth two (2) times daily (with meals). Patient not taking: Reported on 11/16/2022 3/16/22   Muna Marcano NP-C   levothyroxine (SYNTHROID) 25 mcg tablet Take 1 Tablet by mouth Daily (before breakfast).   Patient not taking: Reported on 11/16/2022 3/16/22   Muna Marcano NP-C       Hospital Medications:  Current Facility-Administered Medications   Medication Dose Route Frequency    albuterol (PROVENTIL HFA, VENTOLIN HFA, PROAIR HFA) inhaler 2 Puff  2 Puff Inhalation Q4H PRN    levothyroxine (SYNTHROID) tablet 25 mcg  25 mcg Oral ACB    amLODIPine (NORVASC) tablet 2.5 mg  2.5 mg Oral DAILY    sodium chloride (NS) flush 5-40 mL  5-40 mL IntraVENous Q8H    sodium chloride (NS) flush 5-40 mL  5-40 mL IntraVENous PRN    acetaminophen (TYLENOL) tablet 650 mg  650 mg Oral Q6H PRN    Or    acetaminophen (TYLENOL) suppository 650 mg  650 mg Rectal Q6H PRN    polyethylene glycol (MIRALAX) packet 17 g  17 g Oral DAILY PRN    ondansetron (ZOFRAN ODT) tablet 4 mg  4 mg Oral Q8H PRN    Or    ondansetron (ZOFRAN) injection 4 mg  4 mg IntraVENous Q6H PRN    enoxaparin (LOVENOX) injection 40 mg  40 mg SubCUTAneous DAILY    0.9% sodium chloride infusion  75 mL/hr IntraVENous CONTINUOUS    insulin lispro (HUMALOG) injection   SubCUTAneous Q6H    hydrALAZINE (APRESOLINE) 20 mg/mL injection 10 mg  10 mg IntraVENous Q6H PRN       Social History:  Social History     Tobacco Use    Smoking status: Never    Smokeless tobacco: Never   Substance Use Topics    Alcohol use: Never       Pt denies any history of drug use, blood transfusions. Family History:  Family History   Problem Relation Age of Onset    COPD Mother     Lung Cancer Father     Diabetes Maternal Grandmother        Review of Systems:  A detailed 10 system ROS is obtained, with pertinent positives as listed above. All others are negative as far as what is obtainable. Diet: N.p.o.    Objective:     Physical Exam:  Vitals:  Visit Vitals  BP (!) 155/91   Pulse 69   Temp 97.8 °F (36.6 °C)   Resp 13   Ht 5' 9\" (1.753 m)   Wt 93 kg (205 lb)   SpO2 97%   BMI 30.27 kg/m²     Gen:  Pt is alert, cooperative, no acute distress  Skin:  Extremities and face reveal no rashes. HEENT: Sclerae anicteric. Extra-occular muscles are intact. No oral ulcers. No abnormal pigmentation of the lips. The neck is supple.   Cardiovascular: Regular rate and rhythm. No murmurs, gallops, or rubs. Respiratory:  Comfortable breathing with no accessory muscle use. Clear breath sounds anteriorly with no wheezes, rales, or rhonchi. GI:  Abdomen nondistended, soft, and nontender. Normal active bowel sounds. No enlargement of the liver or spleen. No masses palpable. Patient has a moderate sized rectus abdominis diastases on palpation and increased intra-abdominal pressure. This is tender when his abdomen is relaxed. Rectal:  Deferred  Musculoskeletal:  No pitting edema of the lower legs. Neurological:  Gross memory appears intact. Patient is alert and oriented. Psychiatric:  Mood appears appropriate with judgement intact. Lymphatic:  No cervical or supraclavicular adenopathy.     Laboratory:      Lab Results   Component Value Date    LPSE 256 11/17/2022     11/17/2022    K 3.7 11/17/2022     11/17/2022    CO2 26 11/17/2022    AGAP 8 11/17/2022     (H) 11/17/2022    BUN 15 11/17/2022    CREA 0.70 11/17/2022    BUCR 21 (H) 11/17/2022    GFRAA >60 03/03/2022    GFRNA >60 03/03/2022    CA 8.5 11/17/2022    TBILI 0.2 11/17/2022    AST 12 11/17/2022    ALT 24 11/17/2022    AP 79 11/17/2022    TP 6.5 11/17/2022    ALB 3.2 (L) 11/17/2022    GLOB 3.3 11/17/2022    AGRAT 1.0 11/17/2022    WBC 5.4 11/17/2022    RBC 4.92 11/17/2022    HGB 14.0 11/17/2022    HCT 41.5 11/17/2022    MCV 84.3 11/17/2022    MCH 28.5 11/17/2022    MCHC 33.7 11/17/2022    RDW 12.5 11/17/2022     11/17/2022    MPLV 9.9 11/17/2022    GRANS 50 11/17/2022    LYMPH 38 11/17/2022    MONOS 9 11/17/2022    EOS 3 11/17/2022    BASOS 0 11/17/2022    IG 0 11/17/2022    ANEU 2.7 11/17/2022    ABL 2.1 11/17/2022    ABM 0.5 11/17/2022    RUTH ANN 0.2 11/17/2022    ABB 0.0 11/17/2022    AIG 0.0 11/17/2022   results  Lab Results   Component Value Date    LPSE 256 11/17/2022     11/17/2022    K 3.7 11/17/2022     11/17/2022    CO2 26 11/17/2022    AGAP 8 11/17/2022     (H) 11/17/2022    BUN 15 11/17/2022    CREA 0.70 11/17/2022    BUCR 21 (H) 11/17/2022    GFRAA >60 03/03/2022    GFRNA >60 03/03/2022    CA 8.5 11/17/2022    TBILI 0.2 11/17/2022    AST 12 11/17/2022    ALT 24 11/17/2022    AP 79 11/17/2022    TP 6.5 11/17/2022    ALB 3.2 (L) 11/17/2022    GLOB 3.3 11/17/2022    AGRAT 1.0 11/17/2022    WBC 5.4 11/17/2022    RBC 4.92 11/17/2022    HGB 14.0 11/17/2022    HCT 41.5 11/17/2022    MCV 84.3 11/17/2022    MCH 28.5 11/17/2022    MCHC 33.7 11/17/2022    RDW 12.5 11/17/2022     11/17/2022    MPLV 9.9 11/17/2022    GRANS 50 11/17/2022    LYMPH 38 11/17/2022    MONOS 9 11/17/2022    EOS 3 11/17/2022    BASOS 0 11/17/2022    IG 0 11/17/2022    ANEU 2.7 11/17/2022    ABL 2.1 11/17/2022    ABM 0.5 11/17/2022    RUTH ANN 0.2 11/17/2022    ABB 0.0 11/17/2022    AIG 0.0 11/17/2022            CT Results (most recent):  Results from [place] encounter on 11/16/22    CT ABD PELV W CONT    Narrative  EXAM: CT of the abdomen and pelvis    CLINICAL INDICATION/HISTORY: Chest pain, left upper quadrant abdominal pain    COMPARISON: None. TECHNIQUE: Axial CT imaging of the abdomen and pelvis was performed with  intravenous contrast. Multiplanar reformats were generated. One or more dose  reduction techniques were used on this CT: automated exposure control,  adjustment of the mAs and/or kVp according to patient size, and iterative  reconstruction techniques. The specific techniques used on this CT exam have  been documented in the patient's electronic medical record. Digital Imaging and  Communications in Medicine (DICOM) format image data are available to  nonaffiliated external healthcare facilities or entities on a secured, media  free, reciprocally searchable basis with patient authorization for at least a  12-month period after this study. _______________    FINDINGS:    LOWER CHEST: Coronary artery calcifications. LIVER, BILIARY: Liver is normal. No biliary dilation. Gallbladder is  unremarkable. PANCREAS: Normal.    SPLEEN: Normal.    ADRENALS: Normal.    KIDNEYS/URETERS/BLADDER: Normal.    PELVIC ORGANS: Unremarkable. LYMPH NODES: No enlarged lymph nodes. GASTROINTESTINAL TRACT: No bowel dilation or wall thickening. The appendix is  normal.    VASCULATURE: Mild atherosclerosis. BONES: No acute or aggressive osseous abnormalities identified. OTHER: None.    _______________    Impression  No acute abnormalities identified in the abdomen to explain the patient's  abdominal pain. US Results (most recent):  No results found for this or any previous visit. MRI Results (most recent):  No results found for this or any previous visit. Assessment:     Elevated lipase. My opinion is this patient has elevated lipase but does not have pancreatitis. To have a lipase increased over 1100, with a normal pancreas on CT scan, then decreased to normal within 24 hours goes against pancreatitis. There is nothing to suggest common bile duct stone, liver tests are normal, and he has no symptoms suggesting pancreatitis. He could have subclinical inflammation that causes the pancreatitis. Review the literature shows that up to 20% of patients can have an elevated lipase without clinical evidence of pancreatitis. This is most likely due to in this particular case his poorly controlled diabetes and possibly hyperlipidemia. However his triglycerides have never been elevated to the point there would be concern with triglyceride induced pancreatitis (they need to be 502,000 at least). There is at risk for autoimmune pancreatitis given the fact he has diabetes. Again however this is an elevated lab value and no evidence CT corrales or clinically that this is pancreatitis. Treatment for this is better control of his diabetes. Epigastric pain.   His epigastric pain is most likely due to his rectus abdominis diastases and the fact he has been more physically active at work in the last few weeks. This suggests a musculoskeletal type of origin of the pain and not at all related to his GI tract or pancreas. Type 2 diabetes mellitus. This is not well controlled his glucose upon record review is often near 300. It is my opinion that this poor control of his diabetes is the cause of the elevated lipase. Hypertension. The patient is on oral medication for this problem. Hyperlipidemia. The patient is on oral medication for this problem. Colon cancer screening. Given his age over 38, evaluation of his colon is indicated to exclude polyps and malignancy via screening. This can be done as an outpatient. Discussed this with his patient and his wife and he agrees to undergo colonoscopy as an outpatient. Plan: At this point, there is nothing further to do regarding his elevated lipase. He has no clinical or CT evidence of pancreatitis. Therefore there is nothing to treat. I would emphasize he is at risk for autoimmune pancreatitis because of his diabetes and strongly encouraged him to have better control of his diabetes which would help prevent pancreatitis and I believe the elevated lipase as well. Colonoscopy as an outpatient. We will set this up for the patient, after discharge. He will need to hold his diabetes medication the morning of the procedure. Okay to begin allowing the patient to eat. It is also okay from a GI standpoint for the patient to be discharged. Further recommendations pending his clinical course. Thank you very much for this consultation. As I have nothing further to add, I will sign off at this time.     Jessica Britt MD

## 2022-11-17 NOTE — DISCHARGE SUMMARY
Discharge Summary       PATIENT ID: Isha Rodríguez  MRN: [id number]   YOB: 1970    DATE OF ADMISSION: 11/16/2022  5:53 PM    DATE OF DISCHARGE: 11/17/22    PRIMARY CARE PROVIDER: Muna Marcano NP-C     ATTENDING PHYSICIAN: De Giles MD  DISCHARGING PROVIDER: De Giles MD        CONSULTATIONS: IP CONSULT TO GASTROENTEROLOGY  IP CONSULT TO CARDIOLOGY    PROCEDURES/SURGERIES: * No surgery found *    ADMITTING [address] [place] [address] COURSE:   Isha Rodríguez is a 47 y.o. male  has a past medical history of Diabetes ([facility] [address] Hyperlipemia, Hypertension, and Hypothyroid. Patient seen at bedside. Patient states he had what he explained to be upper abdominal pain chest pain all day yesterday. Patient states that it is gone now he has never had that pain before he did not have any other associated symptoms he did not take anything for the pain. No nausea vomiting no fevers reported patient is a diabetic and has hypertension states he is noncompliant due to some personal issues at home has not been taking his medication or following a diabetic diet. Patient states he stopped taking all of his blood pressure medicine and his metformin. Patient states he does not drink alcohol except for a sip every once in a while. On exam patient is tender in the mid to left upper quadrant of his abdomen. Lipase is elevated 1106. Troponin is negative TSH is elevated at 6.67 but patient does have a history of hypothyroidism. Patient sodium is mildly low he will get normal saline at 75 an hour. Patient will remain n.p.o. GI consult and if he experiences pain I will order him pain medicine. Given where his pain is and his symptoms I suspect this is a gastrointestinal issue and is not a cardiac issue but patient will still be admitted for evaluation by cardiology also cardiology consult and repeat troponin. Patient also did note a addiction to caffeine.   Patient states he drinks Coke 0 constantly unsweetened tea, chocolate, anything that he can find with caffeine. Patient also states he keeps Diet Coke by his bedside and drinks it during the night due to his addiction to caffeine. DISCHARGE DIAGNOSES / PLAN:      Assessment & Plan:         Chest pain  Non specific chest pain/abd pain  Normal troponin  Elevated lipase  Abnormal ekg, t wave inversion  Repeat trop in am  Cardiology consul        Pancreatitis  Chest pain/abd pain  Elevated lipase  No hx of ETOH intake  + Diabetic on metformin  Hold metformin  NPO  NS at 75/hr  Gastroenterology consult     Hypothyroidism  This is a chronic problem  Continue Synthroid           Code Status: Full    Day of dc  Pancreatitis NOS  Needs better DM control  Resume metformin which he was not taking, and start lantus  Fu with pcp for titration  Had aytpical cp- trop neg, echo wnl, fu cardio for stress test    FOLLOW UP APPOINTMENTS:    Follow-up Information       Follow up With Specialties Details Why Contact Info    Krys, Muna NOBLES, NP-C Nurse Practitioner   [address] [facility] [id number] [facility]      Alex Silver MD Gastroenterology Follow up in 2 week(s)  [address] [facility] [address] [facility], NP Nurse Practitioner Follow up in 2 week(s)  [address] [facility]  [phone]                 DIET: Low fat, Low cholesterol, dm        DISCHARGE MEDICATIONS:  Current Discharge Medication List        START taking these medications    Details   insulin glargine (Lantus Solostar U-100 Insulin) 100 unit/mL (3 mL) inpn 14 Units by SubCUTAneous route daily. Include 1 month supply etoh swabs, lancets, needles, testing strips for his appropriate meter.   Indications: type 2 diabetes mellitus  Qty: 1 Adjustable Dose Pre-filled Pen Syringe, Refills: 0  Start date: 11/17/2022           CONTINUE these medications which have CHANGED    Details   metFORMIN (GLUCOPHAGE) 1,000 mg tablet Take 1 Tablet by mouth two (2) times daily (with meals). Qty: 60 Tablet, Refills: 0  Start date: 11/19/2022      atorvastatin (LIPITOR) 40 mg tablet Take 1 Tablet by mouth daily. At bedtime  Qty: 30 Tablet, Refills: 0  Start date: 11/17/2022      losartan (COZAAR) 25 mg tablet Take 1 Tablet by mouth daily. Qty: 30 Tablet, Refills: 0  Start date: 11/17/2022           CONTINUE these medications which have NOT CHANGED    Details   lancets misc Check Blood glucose 2-4 x days  Qty: 1 Each, Refills: 11      glucose blood VI test strips (blood glucose test) strip Strips for Contour next EZ  Qty: 100 Strip, Refills: 4      albuterol (ProAir HFA) 90 mcg/actuation inhaler Take 2 Puffs by inhalation every four (4) hours as needed for Wheezing. Qty: 2 Inhaler, Refills: 3    Associated Diagnoses: Wheezing      levothyroxine (SYNTHROID) 25 mcg tablet Take 1 Tablet by mouth Daily (before breakfast). Qty: 90 Tablet, Refills: 1               NOTIFY YOUR PHYSICIAN FOR ANY OF THE FOLLOWING:   Fever over 101 degrees for 24 hours. Chest pain, shortness of breath, fever, chills, nausea, vomiting, diarrhea, change in mentation, falling, weakness, bleeding. Severe pain or pain not relieved by medications. Or, any other signs or symptoms that you may have questions about. DISPOSITION:home    Home With:   OT  PT  HH  RN       Long term SNF/Inpatient Rehab    Independent/assisted living    Hospice    Other:       PATIENT CONDITION AT DISCHARGE:     Functional status    Poor     Deconditioned    x Independent      Cognition    x Lucid     Forgetful     Dementia      Catheters/lines (plus indication)    Robbins     PICC     PEG    x None      Code status    x Full code     DNR      PHYSICAL EXAMINATION AT DISCHARGE:  General:          Alert, cooperative, no distress, appears stated age.      HEENT:           Atraumatic, anicteric sclerae, pink conjunctivae                          No oral ulcers, mucosa moist, throat clear, dentition fair  Neck: Supple, symmetrical  Lungs:             Clear to auscultation bilaterally. No Wheezing or Rhonchi. No rales. Chest wall:      No tenderness  No Accessory muscle use. Heart:              Regular  rhythm,  No  murmur   No edema  Abdomen:        Soft, non-tender. Not distended. Bowel sounds normal  Extremities:     No cyanosis. No clubbing,                            Skin turgor normal, Capillary refill normal  Skin:                Not pale. Not Jaundiced  No rashes   Psych:             Not anxious or agitated.   Neurologic:      Alert, moves all extremities, answers questions appropriately and responds to commands       [address] [place] [address] Street:  Problem List as of 11/17/2022 Date Reviewed: 4/28/2022            Codes Class Noted - Resolved    Pancreatitis ICD-10-CM: K85.90  ICD-9-CM: 619.9  11/16/2022 - Present        Chest pain ICD-10-CM: R07.9  ICD-9-CM: 786.50  11/16/2022 - Present        Hypothyroidism ICD-10-CM: E03.9  ICD-9-CM: 244.9  11/16/2022 - Present        Type 2 diabetes mellitus without complication, without long-term current use of insulin ([facility] ICD-10-CM: E11.9  ICD-9-CM: 250.00  4/1/2022 - Present        Non-compliance ICD-10-CM: Z91.199  ICD-9-CM: V15.81  2/25/2022 - Present        Primary hypertension ICD-10-CM: I10  ICD-9-CM: 401.9  2/25/2022 - Present           Greater than 35 minutes were spent with the patient on counseling and coordination of care    Signed:   Edita Méndez MD  11/17/2022  3:39 PM

## 2022-11-17 NOTE — ASSESSMENT & PLAN NOTE
Non specific chest pain/abd pain  Normal troponin  Elevated lipase  Abnormal ekg, t wave inversion  Repeat trop in am  Cardiology consul

## 2022-11-17 NOTE — ROUTINE PROCESS
TRANSFER - IN REPORT:    Verbal report received from [address] [place] [address] , RN(name) on Padmini Perez  being received from ED(unit) for routine progression of care      Report consisted of patients Situation, Background, Assessment and   Recommendations(SBAR). Information from the following report(s) SBAR, ED Summary, Procedure Summary, Intake/Output, MAR, Recent Results, Med Rec Status, and Quality Measures was reviewed with the receiving nurse. Opportunity for questions and clarification was provided. Assessment completed upon patients arrival to unit and care assumed. Pt is A&Ox4, ambulatory, and denies any chest pain. Lungs clear in all lobes and bowel sounds present in all quadrants. PIV to R AC infiltrated and was removed. New #20 gauge PIV placed in R FA with good blood return. Normal Saline initiated at a rate of 75ml/hr. 0020  Provider in to see pt.     Deanna Langston  Lab in to draw bood for AM labs. Pt denies pain or needs. [id number] Bedside shift change report given to NAOMIE Perry LPN (oncoming nurse) by MAGGI Killian RN (offgoing nurse). Report included the following information SBAR, Kardex, Procedure Summary, Intake/Output, MAR, Recent Results, Med Rec Status, and Quality Measures.

## 2022-11-17 NOTE — PROGRESS NOTES
Rounding, assessment, VS and white board completed. Asleep but easily aroused. Denies c/o pain or discomfort. IVF ongoing w/o difficulty. Safety measures in place. Bed low/locked, Srx2, CB and all possessions in reach. Will continue to monitor.

## 2022-11-17 NOTE — ASSESSMENT & PLAN NOTE
Chest pain/abd pain  Elevated lipase  No hx of ETOH intake  + Diabetic on metformin  Hold metformin  NPO  NS at 75/hr  Gastroenterology consult

## 2022-11-17 NOTE — PROGRESS NOTES
Care Management Interventions  PCP Verified by CM: Yes  Transition of Care Consult (CM Consult): Discharge Planning  Physical Therapy Consult: No  Occupational Therapy Consult: No  Speech Therapy Consult: No  Support Systems: Spouse/Significant Other, Other Family Member(s), Child(ra)  Confirm Follow Up Transport: Family  The Plan for Transition of Care is Related to the Following Treatment Goals : Patient centered discharge planning to ensure smooth transition to community and [facility]. Discharge Location  Patient Expects to be Discharged to[de-identified] Home      Pt placed in OBS after presenting to ED with complaints of  chest and abdominal pain,  Cardiology and GI consulted. Hospitalist consulted for admission and placed in OBS. Pt lives with wife and children and is independent of ADLs. No DME use or needs. No HH needs. CM following for admission.

## 2022-11-17 NOTE — ED NOTES
10:02 PM rec'd pt in sign out, feels better, pain free, abnl ekg, no old ekgs. Cp w h/o dm/htn. No prior cardiac evaluation. Also pancreatitis. States rare etoh use only.    D/w np Olena West, to admit

## 2022-11-17 NOTE — PROGRESS NOTES
Problem: Pain  Goal: *Control of Pain  Outcome: Progressing Towards Goal     Problem: Patient Education: Go to Patient Education Activity  Goal: Patient/Family Education  Outcome: Progressing Towards Goal     Problem: Pancreatitis  Goal: *Control of acute pain  Outcome: Progressing Towards Goal  Goal: *Absence of nausea/vomiting  Outcome: Progressing Towards Goal  Goal: *Optimize nutritional status  Outcome: Progressing Towards Goal  Goal: *Labs within defined limits  Outcome: Progressing Towards Goal     Problem: Patient Education: Go to Patient Education Activity  Goal: Patient/Family Education  Outcome: Progressing Towards Goal

## 2022-11-17 NOTE — PROGRESS NOTES
Rounding and medication pass completed. Discharge coordinator at bedside to discuss plan of care. Will continue to monitor. CB in reach.

## 2022-11-18 ENCOUNTER — PATIENT OUTREACH (OUTPATIENT)
Dept: CASE MANAGEMENT | Age: 52
End: 2022-11-18

## 2022-11-18 LAB
ATRIAL RATE: 69 BPM
ATRIAL RATE: 80 BPM
CALCULATED P AXIS, ECG09: 49 DEGREES
CALCULATED P AXIS, ECG09: 54 DEGREES
CALCULATED R AXIS, ECG10: 13 DEGREES
CALCULATED R AXIS, ECG10: 38 DEGREES
CALCULATED T AXIS, ECG11: -140 DEGREES
CALCULATED T AXIS, ECG11: -176 DEGREES
DIAGNOSIS, 93000: NORMAL
DIAGNOSIS, 93000: NORMAL
P-R INTERVAL, ECG05: 172 MS
P-R INTERVAL, ECG05: 181 MS
Q-T INTERVAL, ECG07: 400 MS
Q-T INTERVAL, ECG07: 414 MS
QRS DURATION, ECG06: 90 MS
QRS DURATION, ECG06: 92 MS
QTC CALCULATION (BEZET), ECG08: 441 MS
QTC CALCULATION (BEZET), ECG08: 459 MS
VENTRICULAR RATE, ECG03: 68 BPM
VENTRICULAR RATE, ECG03: 79 BPM

## 2022-11-18 NOTE — PROGRESS NOTES
Care Transitions Initial Call    Call within 2 business days of discharge: Yes     Patient: Susi Ibarra Patient : 1970 MRN: [id number]    Last Discharge  Street       Date Complaint Diagnosis Description Type Department Provider    22 Chest Pain Chest pain, unspecified type . .. ED to Hosp-Admission (Discharged) (ADMIT) [id number] Kevin Barrett MD; Catherine Wade-... Was this an external facility discharge? No Discharge Facility: N/A    Challenges to be reviewed by the provider   Additional needs identified to be addressed with provider: no  none         Method of communication with provider : none    Discussed COVID-19 related testing which was not done at this time. Test results were not done. Patient informed of results, if available? N/A     Advance Care Planning:   Does patient have an Advance Directive: not on file. Inpatient Readmission Risk score: No data recorded  Was this a readmission? no   Patient stated reason for the admission: N/A    Patients top risk factors for readmission: medical condition-pancreatitis/diabetes       Care Transition Nurse (CTN) contacted the patient by telephone to perform post hospital discharge assessment. No answer. Left HIPPA compliant message. Name, role and contact information provided. Requested return call. Will attempt to contact at a later time. Plan for follow-up call in 3-5 days based on severity of symptoms and risk factors. Plan for next call:  complete initial transition of care outreach   CTN provided contact information for future needs. Goals Addressed                   This Visit's Progress     Attends follow-up appointments as directed. Goal: Patient will attend all appointments scheduled within the next 30 days. Ensure provider appt is scheduled within 7 business days post-discharge;: JUSTYNA ROGERS appt request sent.     Confirm patient attended post-discharge provider appt   Complete post-visit call to confirm attendance and update care needs

## 2022-11-21 ENCOUNTER — PATIENT OUTREACH (OUTPATIENT)
Dept: CASE MANAGEMENT | Age: 52
End: 2022-11-21

## 2022-11-21 RX ORDER — IBUPROFEN 200 MG
CAPSULE ORAL
Qty: 100 STRIP | Refills: 4 | Status: SHIPPED | OUTPATIENT
Start: 2022-11-21 | End: 2022-11-23 | Stop reason: SDUPTHER

## 2022-11-21 RX ORDER — IBUPROFEN 200 MG
CAPSULE ORAL
Qty: 100 STRIP | Refills: 4 | Status: SHIPPED | OUTPATIENT
Start: 2022-11-21 | End: 2022-11-21

## 2022-11-21 NOTE — PROGRESS NOTES
Care Transitions Initial Call    Call within 2 business days of discharge: Yes     Patient: Tracy Ryan Patient : 1970 MRN: [id number]    Last Discharge  Street       Date Complaint Diagnosis Description Type Department Provider    22 Chest Pain Chest pain, unspecified type . .. ED to Hosp-Admission (Discharged) (ADMIT) [id number] Mel Ang MD; Maverick Villaseñor... Was this an external facility discharge? No Discharge Facility: N/A    Challenges to be reviewed by the provider   Additional needs identified to be addressed with provider: no  none         Method of communication with provider : none    Discussed COVID-19 related testing which was not done at this time. Test results were not done. Patient informed of results, if available? N/A     Advance Care Planning:   Does patient have an Advance Directive: not on file. Inpatient Readmission Risk score: No data recorded  Was this a readmission? no   Patient stated reason for the admission: N/A    Patients top risk factors for readmission: medical condition-pancreatitis       Care Transition Nurse (CTN) contacted the patient by telephone to perform post hospital discharge assessment. No answer. Left HIPPA compliant message. Name, role and contact information provided. Requested return call. Second attempt. Unable to reach patient x 2 attempts. Episode closed.

## 2022-11-23 RX ORDER — IBUPROFEN 200 MG
CAPSULE ORAL
Qty: 100 STRIP | Refills: 4 | Status: SHIPPED | OUTPATIENT
Start: 2022-11-23

## 2022-12-08 ENCOUNTER — TELEPHONE (OUTPATIENT)
Dept: FAMILY MEDICINE CLINIC | Age: 52
End: 2022-12-08

## 2022-12-08 NOTE — TELEPHONE ENCOUNTER
Patient called in he will be ntp, patient wants to discuss the possibility of being prescribed lantus. He was given the medication at the hospital for a short period of time but states it is really helping he states metformin hasn't been helping much.

## 2022-12-09 DIAGNOSIS — E11.9 TYPE 2 DIABETES MELLITUS WITHOUT COMPLICATION, WITHOUT LONG-TERM CURRENT USE OF INSULIN (HCC): ICD-10-CM

## 2022-12-09 DIAGNOSIS — E11.9 TYPE 2 DIABETES MELLITUS WITHOUT COMPLICATION, WITHOUT LONG-TERM CURRENT USE OF INSULIN (HCC): Primary | ICD-10-CM

## 2022-12-09 RX ORDER — INSULIN GLARGINE 100 [IU]/ML
14 INJECTION, SOLUTION SUBCUTANEOUS DAILY
Qty: 2 ADJUSTABLE DOSE PRE-FILLED PEN SYRINGE | Refills: 1 | Status: SHIPPED | OUTPATIENT
Start: 2022-12-09

## 2022-12-09 RX ORDER — INSULIN GLARGINE 100 [IU]/ML
14 INJECTION, SOLUTION SUBCUTANEOUS DAILY
Qty: 1 ADJUSTABLE DOSE PRE-FILLED PEN SYRINGE | Refills: 1 | Status: SHIPPED | OUTPATIENT
Start: 2022-12-09 | End: 2022-12-09 | Stop reason: SDUPTHER

## 2022-12-09 RX ORDER — METFORMIN HYDROCHLORIDE 1000 MG/1
1000 TABLET ORAL 2 TIMES DAILY WITH MEALS
Qty: 180 TABLET | Refills: 1 | Status: SHIPPED | OUTPATIENT
Start: 2022-12-09

## 2023-01-12 ENCOUNTER — OFFICE VISIT (OUTPATIENT)
Dept: FAMILY MEDICINE CLINIC | Age: 53
End: 2023-01-12
Payer: COMMERCIAL

## 2023-01-12 VITALS
HEART RATE: 82 BPM | BODY MASS INDEX: 30.14 KG/M2 | TEMPERATURE: 98.2 F | DIASTOLIC BLOOD PRESSURE: 93 MMHG | OXYGEN SATURATION: 97 % | HEIGHT: 69 IN | SYSTOLIC BLOOD PRESSURE: 159 MMHG | WEIGHT: 203.5 LBS

## 2023-01-12 DIAGNOSIS — R94.31 T WAVE INVERSION IN EKG: ICD-10-CM

## 2023-01-12 DIAGNOSIS — I10 UNCONTROLLED HYPERTENSION: Primary | ICD-10-CM

## 2023-01-12 DIAGNOSIS — E11.65 UNCONTROLLED TYPE 2 DIABETES MELLITUS WITH HYPERGLYCEMIA (HCC): ICD-10-CM

## 2023-01-12 PROBLEM — Z79.4 TYPE 2 DIABETES MELLITUS WITH HYPERGLYCEMIA, WITH LONG-TERM CURRENT USE OF INSULIN (HCC): Status: ACTIVE | Noted: 2022-04-01

## 2023-01-12 PROBLEM — R07.9 CHEST PAIN: Status: RESOLVED | Noted: 2022-11-16 | Resolved: 2023-01-12

## 2023-01-12 PROCEDURE — 3077F SYST BP >= 140 MM HG: CPT | Performed by: STUDENT IN AN ORGANIZED HEALTH CARE EDUCATION/TRAINING PROGRAM

## 2023-01-12 PROCEDURE — 99214 OFFICE O/P EST MOD 30 MIN: CPT | Performed by: STUDENT IN AN ORGANIZED HEALTH CARE EDUCATION/TRAINING PROGRAM

## 2023-01-12 PROCEDURE — 3080F DIAST BP >= 90 MM HG: CPT | Performed by: STUDENT IN AN ORGANIZED HEALTH CARE EDUCATION/TRAINING PROGRAM

## 2023-01-12 RX ORDER — IBUPROFEN 200 MG
CAPSULE ORAL
Qty: 100 STRIP | Refills: 4 | Status: SHIPPED | OUTPATIENT
Start: 2023-01-12

## 2023-01-12 RX ORDER — LOSARTAN POTASSIUM 50 MG/1
50 TABLET ORAL DAILY
Qty: 90 TABLET | Refills: 3 | Status: SHIPPED | OUTPATIENT
Start: 2023-01-12

## 2023-01-12 RX ORDER — FLASH GLUCOSE SENSOR
KIT MISCELLANEOUS
Qty: 2 KIT | Refills: 3 | Status: SHIPPED | OUTPATIENT
Start: 2023-01-12

## 2023-01-12 NOTE — PROGRESS NOTES
Subjective:   Tracy Ryan is a 47 y.o. male who was seen for Abdominal Pain    Patient with recent hospitalization in end of November for questionable acute pancreatitis that resolved after insulin injection. Reports he was on compliant with his blood pressure and diabetes medications. Reports he thought he was having heart attack and when he presented his work-up was okay except he had an EKG with T wave inversions which were new. Reports that he was told that when they rechecked it prior to discharge it went away. Reports that since then he has been checking his blood sugar twice a day. Reports that initially they were uncontrolled in the 300s but no other more consistently in the 150s to 250s range. He is cutting back on carbs. Reports he wants a continuous glucose monitor. Has been loosing weight. Reports that he has been on metformin and janumet and januvia in the past.  He does enjoy drinking Cokes zeros and has about 6 of them a day. Home Medications    Medication Sig Start Date End Date Taking? Authorizing Provider   insulin glargine (Lantus Solostar U-100 Insulin) 100 unit/mL (3 mL) inpn 14 Units by SubCUTAneous route daily. Indications: type 2 diabetes mellitus 12/9/22  Yes Rosa Puente NP   metFORMIN (GLUCOPHAGE) 1,000 mg tablet Take 1 Tablet by mouth two (2) times daily (with meals). 12/9/22  Yes Rosa Puente NP   atorvastatin (LIPITOR) 40 mg tablet Take 1 Tablet by mouth daily. At bedtime 11/17/22  Yes Mel Ang MD   losartan (COZAAR) 25 mg tablet Take 1 Tablet by mouth daily. 11/17/22  Yes Mel Ang MD   levothyroxine (SYNTHROID) 25 mcg tablet Take 1 Tablet by mouth Daily (before breakfast).  3/16/22  Yes Muna Marcano, NP-C   albuterol (ProAir HFA) 90 mcg/actuation inhaler Take 2 Puffs by inhalation every four (4) hours as needed for Wheezing. 9/25/20  Yes Aziza Hinson MD   glucose blood VI test strips (blood glucose test) strip Check blood sugar twice a day 11/23/22   Muna Marcano NP-C   lancets misc Check Blood glucose 2-4 x days 3/16/22   Muna Marcano NP-C      No Known Allergies  Social History     Tobacco Use    Smoking status: Never    Smokeless tobacco: Never   Vaping Use    Vaping Use: Never used   Substance Use Topics    Alcohol use: Never    Drug use: Never        Review of Systems   As noted above in HPI. Objective:   Visit Vitals  BP (!) 159/93   Pulse 82   Temp 98.2 °F (36.8 °C) (Temporal)   Ht 5' 9\" (1.753 m)   Wt 203 lb 8 oz (92.3 kg)   SpO2 97%   BMI 30.05 kg/m²        General: alert, oriented, not in distress  Chest/Lungs: clear breath sounds, no wheezing or crackles  Heart: normal rate, regular rhythm, no murmur  Extremities: no focal deformities, no edema  Skin: no active skin lesions      Assessment & Plan:     1. Uncontrolled hypertension  Uncontrolled. Goal less than 140/90 per JNC 8 guidelines. Will increase losartan to 50 mg daily. Follow-up in 1 month    2. Uncontrolled type 2 diabetes mellitus with hyperglycemia (HCC)  Uncontrolled. Last hemoglobin A1c was 11.9 on 11/17/2022. His blood sugars are improving at home now in the 180s to low 200s. Recommend he continue Lantus 14 units daily and will start Janumet  twice daily. We discussed the risks of pancreatitis with Sitagliptin. Given shared decision-making, will try it as he has tolerated it well in the past and his episode of questionable pancreatitis was quickly resolved after insulin administration. Discussed that if he does have epigastric pain he should stop the medication and restart metformin and call the office. Provided him a pancreatitis appropriate diet as well. 3. T wave inversion in EKG  Patient initially presented to the hospital in November with atypical chest pain that was contributed to pancreatitis. This quickly resolved after getting his diabetes better controlled. However he did have T wave inversions noted on EKG. We will refer to cardiology for repeated possible stress testing. Patient does not have angina  - REFERRAL TO CARDIOLOGY               I have discussed the diagnosis with the patient and the intended plan as seen in the above orders. The patient has received an after-visit summary and questions were answered concerning future plans. I have discussed medication side effects and warnings with the patient as well. I have reviewed the plan of care with the patient, accepted their input and they are in agreement with the treatment goals. Previous lab and imaging results were reviewed by me.        Yasmany Waldrop MD  January 12, 2023

## 2023-01-12 NOTE — PROGRESS NOTES
Kate Sinha presents today for   Chief Complaint   Patient presents with    Abdominal Pain       Is someone accompanying this pt? No    Is the patient using any DME equipment during OV? No     Depression Screening:  3 most recent PHQ Screens 1/12/2023   Little interest or pleasure in doing things Not at all   Feeling down, depressed, irritable, or hopeless Not at all   Total Score PHQ 2 0       Learning Assessment:  No flowsheet data found. Fall Risk  No flowsheet data found. ADL  ADL Assessment 1/12/2023   Feeding yourself No Help Needed   Getting from bed to chair No Help Needed   Getting dressed No Help Needed   Bathing or showering No Help Needed   Walk across the room (includes cane/walker) No Help Needed   Using the telphone No Help Needed   Taking your medications No Help Needed   Preparing meals No Help Needed   Managing money (expenses/bills) No Help Needed   Moderately strenuous housework (laundry) No Help Needed   Shopping for personal items (toiletries/medicines) No Help Needed   Shopping for groceries No Help Needed   Driving No Help Needed   Climbing a flight of stairs No Help Needed   Getting to places beyond walking distances No Help Needed       Health Maintenance reviewed and discussed and ordered per Provider. Health Maintenance Due   Topic Date Due    Pneumococcal 0-64 years (1 - PCV) Never done    Foot Exam Q1  Never done    Eye Exam Retinal or Dilated  Never done    Hepatitis B Vaccine (1 of 3 - Risk 3-dose series) Never done    Colorectal Cancer Screening Combo  Never done   . Coordination of Care:  1. \"Have you been to the ER, urgent care clinic since your last visit? Hospitalized since your last visit? \" Yes Where: [facility] for Stomach and chest pain    2. \"Have you seen or consulted any other health care providers outside of the [address] [place] [address] [place] since your last visit? \" No     3. For patients aged 39-70: Has the patient had a colonoscopy?  No

## 2023-01-17 RX ORDER — INSULIN PUMP SYRINGE, 3 ML
EACH MISCELLANEOUS
COMMUNITY

## 2023-01-17 NOTE — TELEPHONE ENCOUNTER
Patient calls to say that he got the sensor for the free style marshall but he didn;t get he actual reader. Please send rx for reader.

## 2024-04-09 RX ORDER — AMLODIPINE BESYLATE 10 MG/1
10 TABLET ORAL DAILY
COMMUNITY
Start: 2024-03-18

## 2024-04-09 RX ORDER — BLOOD-GLUCOSE METER
1 EACH MISCELLANEOUS 3 TIMES DAILY
COMMUNITY
Start: 2024-03-17

## 2024-04-09 RX ORDER — SITAGLIPTIN 100 MG/1
100 TABLET, FILM COATED ORAL DAILY
COMMUNITY

## 2024-04-09 RX ORDER — PEN NEEDLE, DIABETIC 32GX 5/32"
1 NEEDLE, DISPOSABLE MISCELLANEOUS 3 TIMES DAILY
COMMUNITY
Start: 2024-03-17

## 2024-04-09 RX ORDER — BLOOD SUGAR DIAGNOSTIC
1 STRIP MISCELLANEOUS 3 TIMES DAILY
COMMUNITY
Start: 2024-03-17

## 2024-04-09 RX ORDER — GABAPENTIN 100 MG/1
100 CAPSULE ORAL 3 TIMES DAILY
COMMUNITY
Start: 2024-03-17 | End: 2024-04-10 | Stop reason: SDUPTHER

## 2024-04-09 RX ORDER — VALACYCLOVIR HYDROCHLORIDE 1 G/1
1000 TABLET, FILM COATED ORAL 3 TIMES DAILY
COMMUNITY
Start: 2024-03-16 | End: 2024-04-10 | Stop reason: ALTCHOICE

## 2024-04-09 RX ORDER — LISINOPRIL 40 MG/1
40 TABLET ORAL DAILY
COMMUNITY
Start: 2024-03-18 | End: 2024-04-10

## 2024-04-09 NOTE — PROGRESS NOTES
Discharged from [facility] on 3/17/2024, admitted 3/14/2024 due to chest pain, abnormal EKG and elevated troponin. A1C was 12.5 on 3/16/24. Patient states he had shingles last month per hospital.  Called [facility] and patient no showed appointment that was scheduled for 2/7/2024 with Dr. Gonzalez. Patient states he will call Cardiologist and get rescheduled and refuses to do a Colonoscopy.  C/O that his right hand keeps swelling and painful, elbow pain as well.     Chief Complaint   Patient presents with    Follow-up     Hypertension and diabetes       \"Have you been to the ER, urgent care clinic since your last visit?  Hospitalized since your last visit?\"    NO    “Have you seen or consulted any other health care providers outside of [facility] since your last visit?”    NO    “Have you had a colorectal cancer screening such as a colonoscopy/FIT/Cologuard?    NO REFUSED

## 2024-04-10 ENCOUNTER — OFFICE VISIT (OUTPATIENT)
Facility: CLINIC | Age: 54
End: 2024-04-10
Payer: COMMERCIAL

## 2024-04-10 VITALS
DIASTOLIC BLOOD PRESSURE: 84 MMHG | BODY MASS INDEX: 29.51 KG/M2 | TEMPERATURE: 96.9 F | HEIGHT: 69 IN | HEART RATE: 98 BPM | WEIGHT: 199.2 LBS | SYSTOLIC BLOOD PRESSURE: 156 MMHG | RESPIRATION RATE: 20 BRPM | OXYGEN SATURATION: 95 %

## 2024-04-10 DIAGNOSIS — I10 UNCONTROLLED HYPERTENSION: ICD-10-CM

## 2024-04-10 DIAGNOSIS — E11.65 UNCONTROLLED TYPE 2 DIABETES MELLITUS WITH HYPERGLYCEMIA (HCC): Primary | ICD-10-CM

## 2024-04-10 DIAGNOSIS — B02.9 HERPES ZOSTER WITHOUT COMPLICATION: ICD-10-CM

## 2024-04-10 PROCEDURE — 3077F SYST BP >= 140 MM HG: CPT | Performed by: STUDENT IN AN ORGANIZED HEALTH CARE EDUCATION/TRAINING PROGRAM

## 2024-04-10 PROCEDURE — 99214 OFFICE O/P EST MOD 30 MIN: CPT | Performed by: STUDENT IN AN ORGANIZED HEALTH CARE EDUCATION/TRAINING PROGRAM

## 2024-04-10 PROCEDURE — 3079F DIAST BP 80-89 MM HG: CPT | Performed by: STUDENT IN AN ORGANIZED HEALTH CARE EDUCATION/TRAINING PROGRAM

## 2024-04-10 RX ORDER — VALACYCLOVIR HYDROCHLORIDE 1 G/1
1000 TABLET, FILM COATED ORAL 3 TIMES DAILY
Qty: 21 TABLET | Refills: 0 | Status: SHIPPED | OUTPATIENT
Start: 2024-04-10 | End: 2024-04-17

## 2024-04-10 RX ORDER — GABAPENTIN 100 MG/1
100 CAPSULE ORAL 3 TIMES DAILY
Qty: 90 CAPSULE | Refills: 0 | Status: SHIPPED | OUTPATIENT
Start: 2024-04-10 | End: 2024-05-10

## 2024-04-10 RX ORDER — LOSARTAN POTASSIUM 25 MG/1
25 TABLET ORAL DAILY
Qty: 90 TABLET | Refills: 3 | Status: SHIPPED | OUTPATIENT
Start: 2024-04-10

## 2024-04-10 RX ORDER — GLIPIZIDE 5 MG/1
5 TABLET ORAL 2 TIMES DAILY
Qty: 60 TABLET | Refills: 3 | Status: SHIPPED | OUTPATIENT
Start: 2024-04-10

## 2024-04-10 SDOH — ECONOMIC STABILITY: HOUSING INSECURITY
IN THE LAST 12 MONTHS, WAS THERE A TIME WHEN YOU DID NOT HAVE A STEADY PLACE TO SLEEP OR SLEPT IN A SHELTER (INCLUDING NOW)?: NO

## 2024-04-10 SDOH — ECONOMIC STABILITY: INCOME INSECURITY: HOW HARD IS IT FOR YOU TO PAY FOR THE VERY BASICS LIKE FOOD, HOUSING, MEDICAL CARE, AND HEATING?: NOT HARD AT ALL

## 2024-04-10 SDOH — ECONOMIC STABILITY: FOOD INSECURITY: WITHIN THE PAST 12 MONTHS, YOU WORRIED THAT YOUR FOOD WOULD RUN OUT BEFORE YOU GOT MONEY TO BUY MORE.: NEVER TRUE

## 2024-04-10 SDOH — ECONOMIC STABILITY: FOOD INSECURITY: WITHIN THE PAST 12 MONTHS, THE FOOD YOU BOUGHT JUST DIDN'T LAST AND YOU DIDN'T HAVE MONEY TO GET MORE.: NEVER TRUE

## 2024-04-10 ASSESSMENT — PATIENT HEALTH QUESTIONNAIRE - PHQ9
SUM OF ALL RESPONSES TO PHQ9 QUESTIONS 1 & 2: 0
1. LITTLE INTEREST OR PLEASURE IN DOING THINGS: NOT AT ALL
SUM OF ALL RESPONSES TO PHQ QUESTIONS 1-9: 0
SUM OF ALL RESPONSES TO PHQ QUESTIONS 1-9: 0
2. FEELING DOWN, DEPRESSED OR HOPELESS: NOT AT ALL
SUM OF ALL RESPONSES TO PHQ QUESTIONS 1-9: 0
SUM OF ALL RESPONSES TO PHQ QUESTIONS 1-9: 0

## 2024-04-10 NOTE — PROGRESS NOTES
Subjective:   Bentley Conklin is a 54 y.o. male who was seen for Follow-up (Hypertension and diabetes)    Has been compliant with medications since he got out of the hospital about a month ago.  He reports his blood sugar has been coming down from the 350s to now 250s and a little bit below.  He is taking Lantus 13 units in the evening and Januvia 100 mg daily.  Did not tolerate metformin well due to GI upset and myalgias.  Reports that he has been compliant with amlodipine 5 mg daily since being discharged from the hospital.  He does not have losartan or lisinopril as previously reported.  His main concern is he has been having shingles outbreak to his right upper extremity.  Reports it was almost gone and now feels like it starting to blister again on his hands.  He is completed a course of antivirals and is taking gabapentin 100 mg 3 times daily.      Prior to Admission medications    Medication Sig Start Date End Date Taking? Authorizing Provider   gabapentin (NEURONTIN) 100 MG capsule Take 1 capsule by mouth 3 times daily for 30 days. Max Daily Amount: 300 mg 4/10/24 5/10/24 Yes Izaiah Franco MD   glipiZIDE (GLUCOTROL) 5 MG tablet Take 1 tablet by mouth 2 times daily 4/10/24  Yes Izaiah Franco MD   losartan (COZAAR) 25 MG tablet Take 1 tablet by mouth daily 4/10/24  Yes Izaiah Franco MD   valACYclovir (VALTREX) 1 g tablet Take 1 tablet by mouth 3 times daily for 7 days 1 tab by mouth three times daily for 7 days for shingles 4/10/24 4/17/24 Yes Izaiah Franco MD   amLODIPine (NORVASC) 10 MG tablet Take 1 tablet by mouth daily 3/18/24  Yes ProviderRosemary MD   Blood Glucose Monitoring Suppl (ONE TOUCH ULTRA 2) w/Device KIT 1 kit by Other route 3 times daily Three times daily before meals 3/17/24  Yes Rosemary Graf MD   ONETOUCH ULTRA strip 1 each by Miscell. (Med.Supl.;Non-Drugs) route 3 times daily 3/17/24  Yes Rosemary Graf MD   BD PEN NEEDLE ROYCE 2ND GEN 32G X 4

## 2024-04-17 ENCOUNTER — TELEPHONE (OUTPATIENT)
Facility: CLINIC | Age: 54
End: 2024-04-17

## 2024-04-17 DIAGNOSIS — B02.9 HERPES ZOSTER WITHOUT COMPLICATION: ICD-10-CM

## 2024-04-17 NOTE — TELEPHONE ENCOUNTER
Patient calls because he only has 1 pill left of valacyclovir and says that he still has shingles.  He wants to know if you can send in another prescription or if he should just wait.  He can be reached at [phone]

## 2024-04-18 RX ORDER — VALACYCLOVIR HYDROCHLORIDE 1 G/1
1000 TABLET, FILM COATED ORAL 3 TIMES DAILY
Qty: 21 TABLET | Refills: 0 | Status: SHIPPED | OUTPATIENT
Start: 2024-04-18 | End: 2024-04-25

## 2024-04-26 ENCOUNTER — OFFICE VISIT (OUTPATIENT)
Facility: CLINIC | Age: 54
End: 2024-04-26
Payer: COMMERCIAL

## 2024-04-26 VITALS
OXYGEN SATURATION: 98 % | DIASTOLIC BLOOD PRESSURE: 96 MMHG | HEIGHT: 69 IN | WEIGHT: 202.2 LBS | SYSTOLIC BLOOD PRESSURE: 188 MMHG | BODY MASS INDEX: 29.95 KG/M2 | TEMPERATURE: 97.7 F | HEART RATE: 85 BPM

## 2024-04-26 DIAGNOSIS — E11.65 TYPE 2 DIABETES MELLITUS WITH HYPERGLYCEMIA, WITH LONG-TERM CURRENT USE OF INSULIN (HCC): ICD-10-CM

## 2024-04-26 DIAGNOSIS — Z79.4 TYPE 2 DIABETES MELLITUS WITH HYPERGLYCEMIA, WITH LONG-TERM CURRENT USE OF INSULIN (HCC): ICD-10-CM

## 2024-04-26 DIAGNOSIS — B02.29 HZV (HERPES ZOSTER VIRUS) POST HERPETIC NEURALGIA: Primary | ICD-10-CM

## 2024-04-26 DIAGNOSIS — I10 UNCONTROLLED HYPERTENSION: ICD-10-CM

## 2024-04-26 PROCEDURE — 3080F DIAST BP >= 90 MM HG: CPT | Performed by: STUDENT IN AN ORGANIZED HEALTH CARE EDUCATION/TRAINING PROGRAM

## 2024-04-26 PROCEDURE — 99214 OFFICE O/P EST MOD 30 MIN: CPT | Performed by: STUDENT IN AN ORGANIZED HEALTH CARE EDUCATION/TRAINING PROGRAM

## 2024-04-26 PROCEDURE — 3077F SYST BP >= 140 MM HG: CPT | Performed by: STUDENT IN AN ORGANIZED HEALTH CARE EDUCATION/TRAINING PROGRAM

## 2024-04-26 RX ORDER — GABAPENTIN 300 MG/1
300 CAPSULE ORAL 3 TIMES DAILY
Qty: 90 CAPSULE | Refills: 0 | Status: SHIPPED | OUTPATIENT
Start: 2024-04-26 | End: 2024-05-26

## 2024-04-26 RX ORDER — GLIPIZIDE 10 MG/1
10 TABLET, FILM COATED, EXTENDED RELEASE ORAL DAILY
Qty: 90 TABLET | Refills: 3 | Status: SHIPPED | OUTPATIENT
Start: 2024-04-26

## 2024-04-26 RX ORDER — LOSARTAN POTASSIUM 50 MG/1
50 TABLET ORAL DAILY
Qty: 90 TABLET | Refills: 3 | Status: SHIPPED | OUTPATIENT
Start: 2024-04-26

## 2024-04-26 NOTE — PROGRESS NOTES
Subjective:   Bentley Conklin is a 54 y.o. male who was seen for Other (Fingers swollen on the right hand )    Patient with persistent pain since being treated for her be zoster to the upper extremity.  He is currently taking gabapentin 100 mg nightly which is helping.  His blood pressure is elevated today.  He is concerned that shingles might come back.  Reports his hand gets swollen and he cannot make a fist.  It does get better in the morning.    Prior to Admission medications    Medication Sig Start Date End Date Taking? Authorizing Provider   gabapentin (NEURONTIN) 300 MG capsule Take 1 capsule by mouth 3 times daily for 30 days. Max Daily Amount: 900 mg 4/26/24 5/26/24 Yes Izaiah Franco MD   glipiZIDE (GLUCOTROL XL) 10 MG extended release tablet Take 1 tablet by mouth daily 4/26/24  Yes Izaiah Franco MD   losartan (COZAAR) 50 MG tablet Take 1 tablet by mouth daily 4/26/24  Yes Izaiah Franco MD   amLODIPine (NORVASC) 10 MG tablet Take 1 tablet by mouth daily 3/18/24  Yes Rosemary Graf MD   Blood Glucose Monitoring Suppl (ONE TOUCH ULTRA 2) w/Device KIT 1 kit by Other route 3 times daily Three times daily before meals 3/17/24  Yes Rosemary Graf MD   ONETOUCH ULTRA strip 1 each by Miscell. (Med.Supl.;Non-Drugs) route 3 times daily 3/17/24  Yes Rosemary Graf MD   BD PEN NEEDLE ROYCE 2ND GEN 32G X 4 MM MISC 1 each by Miscell. (Med.Supl.;Non-Drugs) route 3 times daily 3/17/24  Yes Rosemary Graf MD   JANUVIA 100 MG tablet Take 1 tablet by mouth daily   Yes Rosemary Graf MD   Lancets MISC Check Blood glucose 2-4 x days 3/16/22  Yes Automatic Reconciliation, Ar   albuterol sulfate HFA (PROVENTIL;VENTOLIN;PROAIR) 108 (90 Base) MCG/ACT inhaler Inhale 2 puffs into the lungs every 4 hours as needed 9/25/20  Yes Automatic Reconciliation, Ar   insulin glargine (LANTUS SOLOSTAR) 100 UNIT/ML injection pen Inject 13 Units into the skin nightly 12/9/22  Yes Automatic

## 2024-04-26 NOTE — PROGRESS NOTES
Bentley Coelloy presents today for   Chief Complaint   Patient presents with    Other     Fingers swollen on the right hand        Is someone accompanying this pt? no    Is the patient using any DME equipment during OV? No         Health Maintenance reviewed and discussed and ordered per Provider.    Health Maintenance Due   Topic Date Due    Hepatitis B vaccine (1 of 3 - 3-dose series) Never done    COVID-19 Vaccine (1) Never done    Pneumococcal 0-64 years Vaccine (1 of 2 - PCV) Never done    Diabetic foot exam  Never done    HIV screen  Never done    Diabetic retinal exam  Never done    DTaP/Tdap/Td vaccine (1 - Tdap) Never done    Colorectal Cancer Screen  Never done    Shingles vaccine (1 of 2) Never done    Diabetic Alb to Cr ratio (uACR) test  03/03/2023    Lipids  03/03/2023    GFR test (Diabetes, CKD 3-4, OR last GFR 15-59)  11/17/2023   .      \"Have you been to the ER, urgent care clinic since your last visit?  Hospitalized since your last visit?\"    NO    “Have you seen or consulted any other health care providers outside of [facility] since your last visit?”    NO    “Have you had a colorectal cancer screening such as a colonoscopy/FIT/Cologuard?    NO    No colonoscopy on file  No cologuard on file  No FIT/FOBT on file   No flexible sigmoidoscopy on file

## 2024-05-22 ENCOUNTER — OFFICE VISIT (OUTPATIENT)
Facility: CLINIC | Age: 54
End: 2024-05-22
Payer: COMMERCIAL

## 2024-05-22 VITALS
OXYGEN SATURATION: 96 % | TEMPERATURE: 96.8 F | BODY MASS INDEX: 29.82 KG/M2 | RESPIRATION RATE: 18 BRPM | HEIGHT: 69 IN | HEART RATE: 82 BPM | WEIGHT: 201.3 LBS | DIASTOLIC BLOOD PRESSURE: 87 MMHG | SYSTOLIC BLOOD PRESSURE: 168 MMHG

## 2024-05-22 DIAGNOSIS — Z79.4 TYPE 2 DIABETES MELLITUS WITH HYPERGLYCEMIA, WITH LONG-TERM CURRENT USE OF INSULIN (HCC): Primary | ICD-10-CM

## 2024-05-22 DIAGNOSIS — I10 UNCONTROLLED HYPERTENSION: ICD-10-CM

## 2024-05-22 DIAGNOSIS — Z59.86 FINANCIAL INSECURITY: ICD-10-CM

## 2024-05-22 DIAGNOSIS — B02.29 HZV (HERPES ZOSTER VIRUS) POST HERPETIC NEURALGIA: ICD-10-CM

## 2024-05-22 DIAGNOSIS — E11.65 TYPE 2 DIABETES MELLITUS WITH HYPERGLYCEMIA, WITH LONG-TERM CURRENT USE OF INSULIN (HCC): Primary | ICD-10-CM

## 2024-05-22 LAB — HBA1C MFR BLD: 10.9 %

## 2024-05-22 PROCEDURE — 83036 HEMOGLOBIN GLYCOSYLATED A1C: CPT | Performed by: STUDENT IN AN ORGANIZED HEALTH CARE EDUCATION/TRAINING PROGRAM

## 2024-05-22 PROCEDURE — 99214 OFFICE O/P EST MOD 30 MIN: CPT | Performed by: STUDENT IN AN ORGANIZED HEALTH CARE EDUCATION/TRAINING PROGRAM

## 2024-05-22 PROCEDURE — 3077F SYST BP >= 140 MM HG: CPT | Performed by: STUDENT IN AN ORGANIZED HEALTH CARE EDUCATION/TRAINING PROGRAM

## 2024-05-22 PROCEDURE — 3079F DIAST BP 80-89 MM HG: CPT | Performed by: STUDENT IN AN ORGANIZED HEALTH CARE EDUCATION/TRAINING PROGRAM

## 2024-05-22 RX ORDER — SITAGLIPTIN 100 MG/1
100 TABLET, FILM COATED ORAL DAILY
Qty: 90 TABLET | Refills: 3 | Status: SHIPPED | OUTPATIENT
Start: 2024-05-22

## 2024-05-22 RX ORDER — LOSARTAN POTASSIUM 50 MG/1
50 TABLET ORAL DAILY
COMMUNITY

## 2024-05-22 RX ORDER — INSULIN GLARGINE 100 [IU]/ML
10 INJECTION, SOLUTION SUBCUTANEOUS NIGHTLY
Qty: 5 ADJUSTABLE DOSE PRE-FILLED PEN SYRINGE | Refills: 3 | Status: SHIPPED | OUTPATIENT
Start: 2024-05-22

## 2024-05-22 RX ORDER — GABAPENTIN 300 MG/1
300 CAPSULE ORAL 4 TIMES DAILY
Qty: 360 CAPSULE | Refills: 0 | Status: SHIPPED | OUTPATIENT
Start: 2024-05-22 | End: 2024-08-20

## 2024-05-22 SDOH — ECONOMIC STABILITY - INCOME SECURITY: FINANCIAL INSECURITY: Z59.86

## 2024-05-22 NOTE — PROGRESS NOTES
Chief Complaint   Patient presents with    Diabetes     1 mo diabetic follow up       Is someone accompanying this pt? no    Is the patient using any DME equipment during OV? no    Health Maintenance Due   Topic Date Due    Hepatitis B vaccine (1 of 3 - 3-dose series) Never done    COVID-19 Vaccine (1) Never done    Pneumococcal 0-64 years Vaccine (1 of 2 - PCV) Never done    Diabetic foot exam  Never done    HIV screen  Never done    Diabetic retinal exam  Never done    DTaP/Tdap/Td vaccine (1 - Tdap) Never done    Colorectal Cancer Screen  Never done    Shingles vaccine (1 of 2) Never done    Diabetic Alb to Cr ratio (uACR) test  03/03/2023    Lipids  03/03/2023    GFR test (Diabetes, CKD 3-4, OR last GFR 15-59)  11/17/2023       \"Have you been to the ER, urgent care clinic since your last visit?  Hospitalized since your last visit?\"    NO    “Have you seen or consulted any other health care providers outside of [facility] since your last visit?”    NO    “Have you had a colorectal cancer screening such as a colonoscopy/FIT/Cologuard?    NO    No colonoscopy on file  No cologuard on file  No FIT/FOBT on file   No flexible sigmoidoscopy on file

## 2024-05-22 NOTE — PROGRESS NOTES
Subjective:   Bentley Conklin is a 54 y.o. male who was seen for Diabetes (1 mo diabetic follow up)    Fridge broke.  Reports that he did not take his Lantus insulin because he was worried it was no good out of the fridge and he also had liquid from the fridge on it was worried it was contaminated.    Money is an issue. Reports they are discussing dissolving is job June 30th.  Reports this is causing a lot of stress and anxiety for him and he has put his health on the back burner because of it.  Has not been compliant with his blood pressure medications or injections    Reports that the shingles rash is gone, and his feeling is coming back. Reports he is still having a lot of numbness in his right hand.    Prior to Admission medications    Medication Sig Start Date End Date Taking? Authorizing Provider   losartan (COZAAR) 50 MG tablet Take 1 tablet by mouth daily   Yes Rosemary Graf MD   gabapentin (NEURONTIN) 300 MG capsule Take 1 capsule by mouth 4 times daily for 90 days. Max Daily Amount: 1,200 mg 5/22/24 8/20/24 Yes Izaiah Franco MD   JANUVIA 100 MG tablet Take 1 tablet by mouth daily 5/22/24  Yes Izaiah Franco MD   insulin glargine (LANTUS SOLOSTAR) 100 UNIT/ML injection pen Inject 10 Units into the skin nightly 5/22/24  Yes Izaiah Franco MD   glipiZIDE (GLUCOTROL XL) 10 MG extended release tablet Take 1 tablet by mouth daily 4/26/24  Yes Izaiah Franco MD   Blood Glucose Monitoring Suppl (ONE TOUCH ULTRA 2) w/Device KIT 1 kit by Other route 3 times daily Three times daily before meals 3/17/24  Yes ProviderRosemary MD   ONETOUCH ULTRA strip 1 each by ZIMPERIUM. (Med.Supl.;Non-Drugs) route 3 times daily 3/17/24  Yes Rosemary Graf MD   Lancets MISC Check Blood glucose 2-4 x days 3/16/22  Yes Automatic Reconciliation, Ar   albuterol sulfate HFA (PROVENTIL;VENTOLIN;PROAIR) 108 (90 Base) MCG/ACT inhaler Inhale 2 puffs into the lungs every 4 hours as needed 9/25/20  Yes

## 2025-01-23 NOTE — PATIENT INSTRUCTIONS
For diabetes- continue insulin 13 units, start taking it in the morning. Check your blood sugar once a day, fasting and before insulin shot. Continue taking januvia 100 mg daily. Start glipizide 5 mg twice a day.    For high blood pressure- continue amlodipine 5 mg daily. Start losartan 25 mg daily. This medication helps protect your kidneys from damage when you have diabetes. My plan would be to see you in 1 month.    179